# Patient Record
Sex: FEMALE | Race: BLACK OR AFRICAN AMERICAN | NOT HISPANIC OR LATINO | Employment: OTHER | URBAN - METROPOLITAN AREA
[De-identification: names, ages, dates, MRNs, and addresses within clinical notes are randomized per-mention and may not be internally consistent; named-entity substitution may affect disease eponyms.]

---

## 2017-09-05 DIAGNOSIS — R94.31 ABNORMAL EKG: Primary | ICD-10-CM

## 2019-12-06 PROBLEM — N93.9 ABNORMAL UTERINE BLEEDING (AUB): Status: ACTIVE | Noted: 2019-12-06

## 2021-04-16 ENCOUNTER — HOSPITAL ENCOUNTER (EMERGENCY)
Facility: HOSPITAL | Age: 46
Discharge: HOME | End: 2021-04-16
Attending: EMERGENCY MEDICINE
Payer: MEDICAID

## 2021-04-16 VITALS
HEIGHT: 68 IN | WEIGHT: 150 LBS | HEART RATE: 57 BPM | TEMPERATURE: 99 F | BODY MASS INDEX: 22.73 KG/M2 | SYSTOLIC BLOOD PRESSURE: 179 MMHG | OXYGEN SATURATION: 100 % | RESPIRATION RATE: 20 BRPM | DIASTOLIC BLOOD PRESSURE: 106 MMHG

## 2021-04-16 DIAGNOSIS — S16.1XXA STRAIN OF NECK MUSCLE, INITIAL ENCOUNTER: ICD-10-CM

## 2021-04-16 DIAGNOSIS — Z04.1 ENCOUNTER FOR EXAMINATION FOLLOWING MOTOR VEHICLE COLLISION (MVC): Primary | ICD-10-CM

## 2021-04-16 PROCEDURE — 99284 EMERGENCY DEPT VISIT MOD MDM: CPT | Mod: 25

## 2021-04-16 PROCEDURE — 25000003 PHARM REV CODE 250: Performed by: PHYSICIAN ASSISTANT

## 2021-04-16 RX ORDER — OXYCODONE AND ACETAMINOPHEN 5; 325 MG/1; MG/1
1 TABLET ORAL
Status: COMPLETED | OUTPATIENT
Start: 2021-04-16 | End: 2021-04-16

## 2021-04-16 RX ORDER — METHOCARBAMOL 500 MG/1
500 TABLET, FILM COATED ORAL 3 TIMES DAILY
Qty: 30 TABLET | Refills: 0 | Status: SHIPPED | OUTPATIENT
Start: 2021-04-16 | End: 2021-04-21

## 2021-04-16 RX ORDER — NAPROXEN 500 MG/1
500 TABLET ORAL 2 TIMES DAILY PRN
Qty: 30 TABLET | Refills: 0 | Status: SHIPPED | OUTPATIENT
Start: 2021-04-16 | End: 2021-04-21

## 2021-04-16 RX ADMIN — OXYCODONE HYDROCHLORIDE AND ACETAMINOPHEN 1 TABLET: 5; 325 TABLET ORAL at 08:04

## 2022-09-02 DIAGNOSIS — M54.31 BILATERAL SCIATICA: Primary | ICD-10-CM

## 2022-09-02 DIAGNOSIS — M54.32 BILATERAL SCIATICA: Primary | ICD-10-CM

## 2022-10-21 ENCOUNTER — HOSPITAL ENCOUNTER (EMERGENCY)
Facility: HOSPITAL | Age: 47
Discharge: HOME | End: 2022-10-21
Attending: EMERGENCY MEDICINE
Payer: COMMERCIAL

## 2022-10-21 VITALS
RESPIRATION RATE: 19 BRPM | OXYGEN SATURATION: 100 % | HEART RATE: 60 BPM | SYSTOLIC BLOOD PRESSURE: 142 MMHG | HEIGHT: 65 IN | TEMPERATURE: 98 F | DIASTOLIC BLOOD PRESSURE: 86 MMHG | BODY MASS INDEX: 24.99 KG/M2 | WEIGHT: 150 LBS

## 2022-10-21 DIAGNOSIS — K92.1 BLACK STOOLS: ICD-10-CM

## 2022-10-21 DIAGNOSIS — R10.13 ABDOMINAL PAIN, ACUTE, EPIGASTRIC: Primary | ICD-10-CM

## 2022-10-21 DIAGNOSIS — M54.6 ACUTE MIDLINE THORACIC BACK PAIN: ICD-10-CM

## 2022-10-21 LAB
ALBUMIN SERPL BCP-MCNC: 4.1 G/DL (ref 3.5–5.2)
ALP SERPL-CCNC: 146 U/L (ref 55–135)
ALT SERPL W/O P-5'-P-CCNC: 18 U/L (ref 10–44)
ANION GAP SERPL CALC-SCNC: 12 MMOL/L (ref 8–16)
AST SERPL-CCNC: 33 U/L (ref 10–40)
BASOPHILS # BLD AUTO: 0.02 K/UL (ref 0–0.2)
BASOPHILS NFR BLD: 0.4 % (ref 0–1.9)
BILIRUB SERPL-MCNC: 0.2 MG/DL (ref 0.1–1)
BUN SERPL-MCNC: 16 MG/DL (ref 6–20)
CALCIUM SERPL-MCNC: 9.3 MG/DL (ref 8.7–10.5)
CHLORIDE SERPL-SCNC: 109 MMOL/L (ref 95–110)
CO2 SERPL-SCNC: 24 MMOL/L (ref 23–29)
CREAT SERPL-MCNC: 0.9 MG/DL (ref 0.5–1.4)
DIFFERENTIAL METHOD: ABNORMAL
EOSINOPHIL # BLD AUTO: 0.2 K/UL (ref 0–0.5)
EOSINOPHIL NFR BLD: 2.6 % (ref 0–8)
ERYTHROCYTE [DISTWIDTH] IN BLOOD BY AUTOMATED COUNT: 12.7 % (ref 11.5–14.5)
EST. GFR  (NO RACE VARIABLE): >60 ML/MIN/1.73 M^2
GLUCOSE SERPL-MCNC: 125 MG/DL (ref 70–110)
HCT VFR BLD AUTO: 37.3 % (ref 37–48.5)
HGB BLD-MCNC: 11.9 G/DL (ref 12–16)
IMM GRANULOCYTES # BLD AUTO: 0.01 K/UL (ref 0–0.04)
IMM GRANULOCYTES NFR BLD AUTO: 0.2 % (ref 0–0.5)
LIPASE SERPL-CCNC: 41 U/L (ref 4–60)
LYMPHOCYTES # BLD AUTO: 2.3 K/UL (ref 1–4.8)
LYMPHOCYTES NFR BLD: 40.6 % (ref 18–48)
MCH RBC QN AUTO: 29 PG (ref 27–31)
MCHC RBC AUTO-ENTMCNC: 31.9 G/DL (ref 32–36)
MCV RBC AUTO: 91 FL (ref 82–98)
MONOCYTES # BLD AUTO: 0.5 K/UL (ref 0.3–1)
MONOCYTES NFR BLD: 8.1 % (ref 4–15)
NEUTROPHILS # BLD AUTO: 2.7 K/UL (ref 1.8–7.7)
NEUTROPHILS NFR BLD: 48.1 % (ref 38–73)
NRBC BLD-RTO: 0 /100 WBC
PLATELET # BLD AUTO: 203 K/UL (ref 150–450)
PMV BLD AUTO: 11.2 FL (ref 9.2–12.9)
POTASSIUM SERPL-SCNC: 4.1 MMOL/L (ref 3.5–5.1)
PROT SERPL-MCNC: 7.8 G/DL (ref 6–8.4)
RBC # BLD AUTO: 4.1 M/UL (ref 4–5.4)
SODIUM SERPL-SCNC: 145 MMOL/L (ref 136–145)
WBC # BLD AUTO: 5.69 K/UL (ref 3.9–12.7)

## 2022-10-21 PROCEDURE — 85025 COMPLETE CBC W/AUTO DIFF WBC: CPT | Performed by: EMERGENCY MEDICINE

## 2022-10-21 PROCEDURE — 80053 COMPREHEN METABOLIC PANEL: CPT | Performed by: EMERGENCY MEDICINE

## 2022-10-21 PROCEDURE — C9113 INJ PANTOPRAZOLE SODIUM, VIA: HCPCS | Performed by: EMERGENCY MEDICINE

## 2022-10-21 PROCEDURE — 63600175 PHARM REV CODE 636 W HCPCS: Performed by: EMERGENCY MEDICINE

## 2022-10-21 PROCEDURE — 96374 THER/PROPH/DIAG INJ IV PUSH: CPT

## 2022-10-21 PROCEDURE — 99284 EMERGENCY DEPT VISIT MOD MDM: CPT | Mod: 25

## 2022-10-21 PROCEDURE — 93010 EKG 12-LEAD: ICD-10-PCS | Mod: ,,, | Performed by: INTERNAL MEDICINE

## 2022-10-21 PROCEDURE — 83690 ASSAY OF LIPASE: CPT | Performed by: EMERGENCY MEDICINE

## 2022-10-21 PROCEDURE — 93005 ELECTROCARDIOGRAM TRACING: CPT

## 2022-10-21 PROCEDURE — 93010 ELECTROCARDIOGRAM REPORT: CPT | Mod: ,,, | Performed by: INTERNAL MEDICINE

## 2022-10-21 RX ORDER — PANTOPRAZOLE SODIUM 40 MG/1
TABLET, DELAYED RELEASE ORAL
Qty: 20 TABLET | Refills: 0 | Status: SHIPPED | OUTPATIENT
Start: 2022-10-21 | End: 2023-08-15

## 2022-10-21 RX ORDER — PANTOPRAZOLE SODIUM 40 MG/10ML
80 INJECTION, POWDER, LYOPHILIZED, FOR SOLUTION INTRAVENOUS
Status: COMPLETED | OUTPATIENT
Start: 2022-10-21 | End: 2022-10-21

## 2022-10-21 RX ORDER — TRAMADOL HYDROCHLORIDE 50 MG/1
50 TABLET ORAL EVERY 6 HOURS PRN
Qty: 8 TABLET | Refills: 0 | Status: SHIPPED | OUTPATIENT
Start: 2022-10-21 | End: 2022-10-26

## 2022-10-21 RX ADMIN — PANTOPRAZOLE SODIUM 80 MG: 40 INJECTION, POWDER, FOR SOLUTION INTRAVENOUS at 07:10

## 2022-10-21 NOTE — ED PROVIDER NOTES
Encounter Date: 10/21/2022    SCRIBE #1 NOTE: I, Jannie Bowman, am scribing for, and in the presence of,  Sal Beck MD. Other sections scribed: HPI, ROS.     History     Chief Complaint   Patient presents with    Abdominal Pain    Dark stools     Pt reports she has been on pain meds and abx since having a dental implant a week ago. Now she is having dark stools and abdominal pain.      CC: Dark stools    HPI: This is a 46 y.o. F who has HTN, and Mitral Valve Prolapse who presents to the ED for emergent evaluation of acute black stools that began 1 week ago. Pt report getting a dental implant last week, in which she was prescribed pain medication and antibiotic at that time. She believed that the black stools were due to the antibiotics prompting her to stopped taking the antibiotics. Although she stopped taking the antibiotics, the pt states that the black stools persist. Pt also complains of acute epigastric abdominal pain that began 5 days ago, and mid back pain that began this morning. The back pain is not worse with movement. She has been taking Peptobismol and TUMS for the abdominal pain. She has also been experiencing acid reflux. She also reports 1 episode of diarrhea last night. Pt denies a Hx of gastric ulcers, or GI bleeding. She cannot recall if she has ever had an EGD. Pt adds an episode of fatigue, and dizziness that occurred yesterday. She states that the fatigue and dizziness is not unusual for her due to intermittent episodes of fatigue and dizziness secondary to perimenopausal. She denies chances of being pregnant. Her last menstrual period was 6 months ago due to being perimenopausal. Pt states that she was recently started on an antidepressant for hot flashes. She is currently not taking iron supplements, or any other daily supplements. Pt adds that she is seen by a Cardiologist once a year for Mitral valve prolapse. Pt denies fever, chills, ear pain, sore throat, eye problem, cough, SOB,  CP, nausea, vomiting, dysuria, arm or leg problems, rash, or headaches.        The history is provided by the patient. No  was used.   Review of patient's allergies indicates:  No Known Allergies  Past Medical History:   Diagnosis Date    Herpes simplex without mention of complication     Hypertension     MVP (mitral valve prolapse)      Past Surgical History:   Procedure Laterality Date    DILATION AND CURETTAGE OF UTERUS      VAGINAL DELIVERY      x4 normal     Family History   Problem Relation Age of Onset    Diabetes Other     Hypertension Other     Cancer Neg Hx      Social History     Tobacco Use    Smoking status: Never    Smokeless tobacco: Never   Substance Use Topics    Alcohol use: Yes     Comment: Occassionally    Drug use: No     Review of Systems   Constitutional:  Positive for fatigue (resolved). Negative for chills and fever.   HENT:  Negative for congestion, ear pain, rhinorrhea and sore throat.    Eyes:  Negative for visual disturbance.   Respiratory:  Negative for cough and shortness of breath.    Cardiovascular:  Negative for chest pain.   Gastrointestinal:  Positive for abdominal pain and diarrhea (resolved). Negative for nausea and vomiting.        (+) Melena   Genitourinary:  Negative for dysuria.   Musculoskeletal:  Positive for back pain. Negative for myalgias.   Skin:  Negative for rash.   Neurological:  Positive for dizziness (resolved). Negative for weakness.   Hematological:  Does not bruise/bleed easily.     Physical Exam     Initial Vitals [10/21/22 0642]   BP Pulse Resp Temp SpO2   132/80 77 18 97.9 °F (36.6 °C) 100 %      MAP       --         Physical Exam  The patient was examined specifically for the following:   General:No significant distress, Good color, Warm and dry. Head and neck:Scalp atraumatic, Neck supple. Neurological:Appropriate conversation, Gross motor deficits. Eyes:Conjugate gaze, Clear corneas. ENT: No epistaxis. Cardiac: Regular rate and rhythm,  Grossly normal heart tones. Pulmonary: Wheezing, Rales. Gastrointestinal: Abdominal tenderness, Abdominal distention. Musculoskeletal: Extremity deformity, Apparent pain with range of motion of the joints. Skin: Rash.   The findings on examination were normal except for the following:  The patient has almost no stool in the rectal vault.  The glove stains guaiac-negative.  There is no significant abdominal or back tenderness.  There is no guarding rebound mass or distention.  ED Course   Procedures  Labs Reviewed   COMPREHENSIVE METABOLIC PANEL - Abnormal; Notable for the following components:       Result Value    Glucose 125 (*)     Alkaline Phosphatase 146 (*)     All other components within normal limits   CBC W/ AUTO DIFFERENTIAL - Abnormal; Notable for the following components:    Hemoglobin 11.9 (*)     MCHC 31.9 (*)     All other components within normal limits   LIPASE          Imaging Results    None       Medical decision making:  Given the above, this patient presents emergency with epigastric abdominal pain and thoracic back pain.  She reports dark stools.  She had been using Pepto-Bismol.  She is on antibiotics for a dental implant infection.  She denies fever chills sweats facial pain swelling.  She is concerned about blood in her stool.  Stool is guaiac-negative.  There is no significant abdominal tenderness.  I doubt pancreatitis peritonitis GI bleeding.  I will treat with pantoprazole for epigastric discomfort and tramadol for thoracolumbar back pain.  I doubt perforated ulcer.        Medications   pantoprazole injection 80 mg (has no administration in time range)              Scribe Attestation:   Scribe #1: I performed the above scribed service and the documentation accurately describes the services I performed. I attest to the accuracy of the note.                 I personally performed the services described in this documentation.  All medical record  entries made by the scribe are at my  direction and in my presence.  Signed, Dr. Beck    Clinical Impression:   Final diagnoses:  [R10.13] Abdominal pain, acute, epigastric (Primary)  [M54.6] Acute midline thoracic back pain  [K92.1] Black stools      ED Disposition Condition    Discharge Stable          ED Prescriptions       Medication Sig Dispense Start Date End Date Auth. Provider    traMADoL (ULTRAM) 50 mg tablet Take 1 tablet (50 mg total) by mouth every 6 (six) hours as needed for Pain. 8 tablet 10/21/2022 10/26/2022 Sal Beck MD    pantoprazole (PROTONIX) 40 MG tablet Please take 1 tablet by mouth every 12 hours when you have epigastric discomfort, then 1 tablet every day. 20 tablet 10/21/2022 -- Sal Beck MD          Follow-up Information       Follow up With Specialties Details Why Contact Info    Yony Watt MD Family Medicine In 3 days  [address] [facility]  [place] [address]  [phone]               Sal Beck MD  10/21/22 0809

## 2022-10-21 NOTE — DISCHARGE INSTRUCTIONS
Please avoid caffeine carbonation alcohol cigarette citrus tomato Naprosyn aspirin ibuprofen.  Please stop your meloxicam.  Please begin pantoprazole.  Tylenol or Tramadol for back pain.  Please return immediately if you get worse or if new problems develop.  Please follow-up with the primary care doctor next week.

## 2022-10-21 NOTE — ED NOTES
Adult Physical Assessment  LOC: Racquel Orozco, 46 y.o. female verified via two identifiers.  The patient is awake, alert, oriented and speaking appropriately at this time.  APPEARANCE: Patient resting comfortably and appears to be in no acute distress at this time. Patient is clean and well groomed, patient's clothing is properly fastened.  SKIN:The skin is warm and dry, color consistent with ethnicity, patient has normal skin turgor and moist mucus membranes, skin intact, no breakdown or brusing noted.  MUSCULOSKELETAL: Patient moving all extremities well, no obvious swelling or deformities noted.  RESPIRATORY: Airway is open and patent, respirations are spontaneous, patient has a normal effort and rate, no accessory muscle use noted.  CARDIAC: Patient has a normal rate and rhythm, no periphreal edema noted in any extremity, capillary refill < 3 seconds in all extremities  ABDOMEN: Soft and non tender to palpation, no abdominal distention noted. Bowel sounds present in all four quadrants.  NEUROLOGIC: Eyes open spontaneously, behavior appropriate to situation, follows commands, facial expression symmetrical, bilateral hand grasp equal and even, purposeful motor response noted, normal sensation in all extremities when touched with a finger.

## 2023-02-20 ENCOUNTER — HOSPITAL ENCOUNTER (EMERGENCY)
Facility: HOSPITAL | Age: 48
Discharge: HOME | End: 2023-02-20
Attending: EMERGENCY MEDICINE
Payer: COMMERCIAL

## 2023-02-20 VITALS
HEART RATE: 52 BPM | SYSTOLIC BLOOD PRESSURE: 139 MMHG | HEIGHT: 65 IN | TEMPERATURE: 98 F | DIASTOLIC BLOOD PRESSURE: 70 MMHG | WEIGHT: 160 LBS | RESPIRATION RATE: 20 BRPM | BODY MASS INDEX: 26.66 KG/M2 | OXYGEN SATURATION: 100 %

## 2023-02-20 DIAGNOSIS — G43.809 OTHER MIGRAINE WITHOUT STATUS MIGRAINOSUS, NOT INTRACTABLE: Primary | ICD-10-CM

## 2023-02-20 LAB
ALBUMIN SERPL BCP-MCNC: 4.1 G/DL (ref 3.5–5.2)
ALP SERPL-CCNC: 137 U/L (ref 55–135)
ALT SERPL W/O P-5'-P-CCNC: 13 U/L (ref 10–44)
ANION GAP SERPL CALC-SCNC: 9 MMOL/L (ref 8–16)
AST SERPL-CCNC: 25 U/L (ref 10–40)
B-HCG UR QL: NEGATIVE
BASOPHILS # BLD AUTO: 0.03 K/UL (ref 0–0.2)
BASOPHILS NFR BLD: 0.5 % (ref 0–1.9)
BILIRUB SERPL-MCNC: 0.4 MG/DL (ref 0.1–1)
BUN SERPL-MCNC: 11 MG/DL (ref 6–20)
CALCIUM SERPL-MCNC: 9.3 MG/DL (ref 8.7–10.5)
CHLORIDE SERPL-SCNC: 106 MMOL/L (ref 95–110)
CO2 SERPL-SCNC: 25 MMOL/L (ref 23–29)
CREAT SERPL-MCNC: 0.9 MG/DL (ref 0.5–1.4)
CTP QC/QA: YES
DIFFERENTIAL METHOD: ABNORMAL
EOSINOPHIL # BLD AUTO: 0.2 K/UL (ref 0–0.5)
EOSINOPHIL NFR BLD: 2.5 % (ref 0–8)
ERYTHROCYTE [DISTWIDTH] IN BLOOD BY AUTOMATED COUNT: 13.2 % (ref 11.5–14.5)
EST. GFR  (NO RACE VARIABLE): >60 ML/MIN/1.73 M^2
GLUCOSE SERPL-MCNC: 103 MG/DL (ref 70–110)
HCT VFR BLD AUTO: 39.8 % (ref 37–48.5)
HGB BLD-MCNC: 12.6 G/DL (ref 12–16)
IMM GRANULOCYTES # BLD AUTO: 0.02 K/UL (ref 0–0.04)
IMM GRANULOCYTES NFR BLD AUTO: 0.3 % (ref 0–0.5)
LYMPHOCYTES # BLD AUTO: 2.8 K/UL (ref 1–4.8)
LYMPHOCYTES NFR BLD: 43.9 % (ref 18–48)
MCH RBC QN AUTO: 27.8 PG (ref 27–31)
MCHC RBC AUTO-ENTMCNC: 31.7 G/DL (ref 32–36)
MCV RBC AUTO: 88 FL (ref 82–98)
MONOCYTES # BLD AUTO: 0.5 K/UL (ref 0.3–1)
MONOCYTES NFR BLD: 8.2 % (ref 4–15)
NEUTROPHILS # BLD AUTO: 2.8 K/UL (ref 1.8–7.7)
NEUTROPHILS NFR BLD: 44.6 % (ref 38–73)
NRBC BLD-RTO: 0 /100 WBC
PLATELET # BLD AUTO: 238 K/UL (ref 150–450)
PMV BLD AUTO: 11.2 FL (ref 9.2–12.9)
POTASSIUM SERPL-SCNC: 3.6 MMOL/L (ref 3.5–5.1)
PROT SERPL-MCNC: 8 G/DL (ref 6–8.4)
RBC # BLD AUTO: 4.53 M/UL (ref 4–5.4)
SODIUM SERPL-SCNC: 140 MMOL/L (ref 136–145)
WBC # BLD AUTO: 6.36 K/UL (ref 3.9–12.7)

## 2023-02-20 PROCEDURE — 25000003 PHARM REV CODE 250: Performed by: EMERGENCY MEDICINE

## 2023-02-20 PROCEDURE — 96375 TX/PRO/DX INJ NEW DRUG ADDON: CPT

## 2023-02-20 PROCEDURE — 99285 EMERGENCY DEPT VISIT HI MDM: CPT | Mod: 25

## 2023-02-20 PROCEDURE — 96374 THER/PROPH/DIAG INJ IV PUSH: CPT

## 2023-02-20 PROCEDURE — 85025 COMPLETE CBC W/AUTO DIFF WBC: CPT | Performed by: EMERGENCY MEDICINE

## 2023-02-20 PROCEDURE — 96361 HYDRATE IV INFUSION ADD-ON: CPT

## 2023-02-20 PROCEDURE — 63600175 PHARM REV CODE 636 W HCPCS: Performed by: EMERGENCY MEDICINE

## 2023-02-20 PROCEDURE — 81025 URINE PREGNANCY TEST: CPT | Performed by: EMERGENCY MEDICINE

## 2023-02-20 PROCEDURE — 80053 COMPREHEN METABOLIC PANEL: CPT | Performed by: EMERGENCY MEDICINE

## 2023-02-20 RX ORDER — BISOPROLOL FUMARATE 5 MG/1
5 TABLET, FILM COATED ORAL DAILY
COMMUNITY

## 2023-02-20 RX ORDER — DIPHENHYDRAMINE HYDROCHLORIDE 50 MG/ML
25 INJECTION INTRAMUSCULAR; INTRAVENOUS
Status: COMPLETED | OUTPATIENT
Start: 2023-02-20 | End: 2023-02-20

## 2023-02-20 RX ORDER — PROCHLORPERAZINE EDISYLATE 5 MG/ML
10 INJECTION INTRAMUSCULAR; INTRAVENOUS ONCE
Status: COMPLETED | OUTPATIENT
Start: 2023-02-20 | End: 2023-02-20

## 2023-02-20 RX ORDER — BUTALBITAL, ACETAMINOPHEN AND CAFFEINE 50; 325; 40 MG/1; MG/1; MG/1
1 TABLET ORAL EVERY 6 HOURS PRN
Qty: 10 TABLET | Refills: 0 | Status: SHIPPED | OUTPATIENT
Start: 2023-02-20 | End: 2023-03-22

## 2023-02-20 RX ORDER — CETIRIZINE HYDROCHLORIDE 10 MG/1
10 TABLET ORAL DAILY
COMMUNITY
End: 2023-08-15

## 2023-02-20 RX ORDER — AMLODIPINE BESYLATE 10 MG/1
10 TABLET ORAL
COMMUNITY
Start: 2022-09-26 | End: 2023-08-14 | Stop reason: CLARIF

## 2023-02-20 RX ORDER — MULTIVITAMIN
1 TABLET ORAL DAILY
COMMUNITY

## 2023-02-20 RX ORDER — PAROXETINE 10 MG/1
10 TABLET, FILM COATED ORAL EVERY MORNING
COMMUNITY

## 2023-02-20 RX ADMIN — PROCHLORPERAZINE EDISYLATE 10 MG: 5 INJECTION INTRAMUSCULAR; INTRAVENOUS at 06:02

## 2023-02-20 RX ADMIN — DIPHENHYDRAMINE HYDROCHLORIDE 25 MG: 50 INJECTION, SOLUTION INTRAMUSCULAR; INTRAVENOUS at 06:02

## 2023-02-20 RX ADMIN — SODIUM CHLORIDE 1000 ML: 9 INJECTION, SOLUTION INTRAVENOUS at 06:02

## 2023-02-20 NOTE — ED PROVIDER NOTES
"Encounter Date: 2/20/2023    SCRIBE #1 NOTE: I, Francesca Loco, am scribing for, and in the presence of,  Oneil Julien MD. I have scribed the following portions of the note - Other sections scribed: HPI, ROS.     History     Chief Complaint   Patient presents with    Headache     Arrives to ER reports having headache for about a day and last night became worse states "It woke me up." Reports headache started at the top and feels like it radiates to occipital area. Denies n/v/d dizziness, blurred vision. Hx of HTN. + ringing in her ears also reports hasn't taken amlodipine in a few weeks.      47 y.o. female with PMHx of HTN who presents to the ED for chief complaint of headache. Patient reports having a dull headache to the top of her head prior to going to bed. Around 2 AM she was woken up by a severe headache radiating to her entire scalp. She describes her headache as a "cringing" pain. No attempted treatment. She denies ever having a headache to this severity, but notes more frequent headaches since being perimenopausal. Patient says that she had no symptoms yesterday. Patient additionally endorses photophobia, slight neck pain, and a resolved episode of tingling to her left foot. She does note that she was laying in a manner that may have made her foot tingle. Denies any blurry vision, numbness, nausea, vomiting, fever, or chills. Patient takes Losartan, Amlodipine, Paxil, and Bisoprolol. Patient endorses her BP has been running "good" lately, but she was unable to check it during this episode.     The history is provided by the patient. No  was used.   Review of patient's allergies indicates:  No Known Allergies  Past Medical History:   Diagnosis Date    Herpes simplex without mention of complication     Hypertension     MVP (mitral valve prolapse)      Past Surgical History:   Procedure Laterality Date    DILATION AND CURETTAGE OF UTERUS      VAGINAL DELIVERY      x4 normal     Family " History   Problem Relation Age of Onset    Diabetes Other     Hypertension Other     Cancer Neg Hx      Social History     Tobacco Use    Smoking status: Never    Smokeless tobacco: Never   Substance Use Topics    Alcohol use: Yes     Comment: Occassionally    Drug use: No     Review of Systems   Constitutional: Negative.  Negative for chills and fever.   HENT: Negative.     Eyes:  Positive for photophobia. Negative for visual disturbance.   Respiratory: Negative.     Cardiovascular: Negative.    Gastrointestinal: Negative.  Negative for nausea and vomiting.   Genitourinary: Negative.    Musculoskeletal:  Positive for neck pain.   Skin: Negative.    Neurological:  Positive for headaches. Negative for numbness.        (+) Tingling.     Physical Exam     Initial Vitals [02/20/23 0548]   BP Pulse Resp Temp SpO2   (!) 182/80 61 20 98.1 °F (36.7 °C) 99 %      MAP       --         Physical Exam    Nursing note and vitals reviewed.  Constitutional: She appears well-developed and well-nourished. She is not diaphoretic. No distress.   HENT:   Head: Normocephalic and atraumatic.   Nose: Nose normal.   Eyes: EOM are normal. Pupils are equal, round, and reactive to light.   Neck: Neck supple. No JVD present.   Normal range of motion.  Cardiovascular:  Normal rate, regular rhythm, normal heart sounds and intact distal pulses.           Pulmonary/Chest: Breath sounds normal. No stridor. No respiratory distress. She has no wheezes. She has no rales.   Abdominal: Abdomen is soft. Bowel sounds are normal. She exhibits no distension. There is no abdominal tenderness.   Musculoskeletal:         General: No tenderness or edema. Normal range of motion.      Cervical back: Normal range of motion and neck supple.     Neurological: She is alert and oriented to person, place, and time. She has normal strength. No cranial nerve deficit.   Skin: Skin is warm and dry. Capillary refill takes less than 2 seconds. No rash noted. No erythema.        ED Course   Procedures  Labs Reviewed   CBC W/ AUTO DIFFERENTIAL - Abnormal; Notable for the following components:       Result Value    MCHC 31.7 (*)     All other components within normal limits   COMPREHENSIVE METABOLIC PANEL - Abnormal; Notable for the following components:    Alkaline Phosphatase 137 (*)     All other components within normal limits   POCT URINE PREGNANCY          Imaging Results              CT Head Without Contrast (Final result)  Result time 02/20/23 07:55:36      Final result by Andrwe Frazier MD (02/20/23 07:55:36)                   Impression:      No CT evidence of acute intracranial abnormality.      Electronically signed by: Andrew Frazier MD  Date:    02/20/2023  Time:    07:55               Narrative:    EXAMINATION:  CT HEAD WITHOUT CONTRAST    CLINICAL HISTORY:  Headache, new or worsening, neuro deficit (Age 19-49y);    TECHNIQUE:  Low dose axial images were obtained through the head.  Coronal and sagittal reformations were also performed. Contrast was not administered.    COMPARISON:  04/16/2021.    FINDINGS:  No evidence of acute territorial infarct, hemorrhage, mass effect, or midline shift.  Stable prominent intracranial calcifications.    Ventricles are normal in size and configuration.    No displaced calvarial fracture.    Visualized paranasal sinuses and mastoid air cells are essentially clear.                                       Medications   sodium chloride 0.9% bolus 1,000 mL 1,000 mL (1,000 mLs Intravenous New Bag 2/20/23 0637)   prochlorperazine injection Soln 10 mg (10 mg Intravenous Given 2/20/23 0639)   diphenhydrAMINE injection 25 mg (25 mg Intravenous Given 2/20/23 0639)     Medical Decision Making:   History:   Old Medical Records: I decided to obtain old medical records.  Clinical Tests:   Lab Tests: Reviewed and Ordered  Radiological Study: Ordered and Reviewed       MDM:    47 y.o.female with PMHx as noted abvoe presents with headache. Physical  exam as noted above.  ED workup remarkable for neg preg, CBC/CMP wnl, CT head unremarkable.  Pt presentation consistent with Migraine, no physical exam abnormalities, or focal neuro deficits noted, or further red flags to suggest alternate etiology.  Negative workup.  At this time given patient's history, physical exam, and ED workup do not suspect ICH, symptomatic aneurysm, temporal arteritis, meningitis/encephalitis, electrolyte abnormality, acute blood loss anemia, AACG, fracture/trauma, or any further malignant cause.  Pt treated with compazine/benadryl and IVF bolus with complete sxm relief upon reassessment.  Pt advised on further conservative treatment options at home, as well as f/u.  Return precautions given and all questions answered.  Patient in understanding of plan.  Pt discharged to home improved and stable.         Scribe Attestation:   Scribe #1: I performed the above scribed service and the documentation accurately describes the services I performed. I attest to the accuracy of the note.                 I, Oneil Julien M.D, personally performed the services described in this documentation.  All medical record entries made by the scribe were at my direction and in my presence.  I have reviewed the chart and agree that the record reflects my personal performance and is accurate and complete.  Clinical Impression:   Final diagnoses:  [G43.809] Other migraine without status migrainosus, not intractable (Primary)        ED Disposition Condition    Discharge Stable          ED Prescriptions       Medication Sig Dispense Start Date End Date Auth. Provider    butalbital-acetaminophen-caffeine -40 mg (FIORICET, ESGIC) -40 mg per tablet Take 1 tablet by mouth every 6 (six) hours as needed for Pain or Headaches. 10 tablet 2/20/2023 3/22/2023 Oneil Julien MD          Follow-up Information       Follow up With Specialties Details Why Contact Info    [facility] Dept Emergency  Medicine Go to  If symptoms worsen [address] [place] [id number] [phone]    Yony Watt MD Family Medicine Go in 1 week As needed [address] [place] [address]  [phone]               Oneil Julien MD  02/20/23 0893

## 2023-02-20 NOTE — ED NOTES
Pt to ed c/o headache that woke her up from her sleep. States headache began at the top of her head before she went to bed but now the pain is all over. Also c/o ringing in her ears. Denies blurry vision, n/v. States she has not been taking her amlodipine for at least 2 weeks but took it yesterday around noon.

## 2023-08-14 ENCOUNTER — HOSPITAL ENCOUNTER (OUTPATIENT)
Facility: HOSPITAL | Age: 48
Discharge: HOME | End: 2023-08-15
Attending: EMERGENCY MEDICINE | Admitting: EMERGENCY MEDICINE
Payer: COMMERCIAL

## 2023-08-14 DIAGNOSIS — I10 HYPERTENSION: ICD-10-CM

## 2023-08-14 DIAGNOSIS — I16.1 HYPERTENSIVE EMERGENCY: Primary | ICD-10-CM

## 2023-08-14 DIAGNOSIS — R79.89 ELEVATED TROPONIN: ICD-10-CM

## 2023-08-14 DIAGNOSIS — R07.9 CHEST PAIN: ICD-10-CM

## 2023-08-14 DIAGNOSIS — R79.89 TROPONIN LEVEL ELEVATED: ICD-10-CM

## 2023-08-14 DIAGNOSIS — R51.9 NONINTRACTABLE HEADACHE, UNSPECIFIED CHRONICITY PATTERN, UNSPECIFIED HEADACHE TYPE: ICD-10-CM

## 2023-08-14 LAB
ALBUMIN SERPL BCP-MCNC: 4.3 G/DL (ref 3.5–5.2)
ALP SERPL-CCNC: 173 U/L (ref 55–135)
ALT SERPL W/O P-5'-P-CCNC: 21 U/L (ref 10–44)
AMPHET+METHAMPHET UR QL: NEGATIVE
ANION GAP SERPL CALC-SCNC: 11 MMOL/L (ref 8–16)
AST SERPL-CCNC: 37 U/L (ref 10–40)
B-HCG UR QL: NEGATIVE
BACTERIA #/AREA URNS HPF: NORMAL /HPF
BARBITURATES UR QL SCN>200 NG/ML: NEGATIVE
BASOPHILS # BLD AUTO: 0.02 K/UL (ref 0–0.2)
BASOPHILS NFR BLD: 0.3 % (ref 0–1.9)
BENZODIAZ UR QL SCN>200 NG/ML: NEGATIVE
BILIRUB SERPL-MCNC: 0.3 MG/DL (ref 0.1–1)
BILIRUB UR QL STRIP: NEGATIVE
BNP SERPL-MCNC: 48 PG/ML (ref 0–99)
BUN SERPL-MCNC: 15 MG/DL (ref 6–20)
BZE UR QL SCN: NEGATIVE
CALCIUM SERPL-MCNC: 9.3 MG/DL (ref 8.7–10.5)
CANNABINOIDS UR QL SCN: NEGATIVE
CHLORIDE SERPL-SCNC: 106 MMOL/L (ref 95–110)
CLARITY UR: CLEAR
CO2 SERPL-SCNC: 24 MMOL/L (ref 23–29)
COLOR UR: COLORLESS
CREAT SERPL-MCNC: 1.1 MG/DL (ref 0.5–1.4)
CREAT UR-MCNC: 13.5 MG/DL (ref 15–325)
CTP QC/QA: YES
DIFFERENTIAL METHOD: NORMAL
EOSINOPHIL # BLD AUTO: 0.1 K/UL (ref 0–0.5)
EOSINOPHIL NFR BLD: 1.8 % (ref 0–8)
ERYTHROCYTE [DISTWIDTH] IN BLOOD BY AUTOMATED COUNT: 12.9 % (ref 11.5–14.5)
EST. GFR  (NO RACE VARIABLE): >60 ML/MIN/1.73 M^2
GLUCOSE SERPL-MCNC: 116 MG/DL (ref 70–110)
GLUCOSE UR QL STRIP: NEGATIVE
HCT VFR BLD AUTO: 41.2 % (ref 37–48.5)
HGB BLD-MCNC: 13.2 G/DL (ref 12–16)
HGB UR QL STRIP: NEGATIVE
HYALINE CASTS #/AREA URNS LPF: 0 /LPF
IMM GRANULOCYTES # BLD AUTO: 0.01 K/UL (ref 0–0.04)
IMM GRANULOCYTES NFR BLD AUTO: 0.1 % (ref 0–0.5)
KETONES UR QL STRIP: NEGATIVE
LEUKOCYTE ESTERASE UR QL STRIP: NEGATIVE
LYMPHOCYTES # BLD AUTO: 3.4 K/UL (ref 1–4.8)
LYMPHOCYTES NFR BLD: 46.1 % (ref 18–48)
MCH RBC QN AUTO: 27.9 PG (ref 27–31)
MCHC RBC AUTO-ENTMCNC: 32 G/DL (ref 32–36)
MCV RBC AUTO: 87 FL (ref 82–98)
METHADONE UR QL SCN>300 NG/ML: NEGATIVE
MICROSCOPIC COMMENT: NORMAL
MONOCYTES # BLD AUTO: 0.7 K/UL (ref 0.3–1)
MONOCYTES NFR BLD: 9.3 % (ref 4–15)
NEUTROPHILS # BLD AUTO: 3.1 K/UL (ref 1.8–7.7)
NEUTROPHILS NFR BLD: 42.4 % (ref 38–73)
NITRITE UR QL STRIP: NEGATIVE
NRBC BLD-RTO: 0 /100 WBC
OPIATES UR QL SCN: NEGATIVE
PCP UR QL SCN>25 NG/ML: NEGATIVE
PH UR STRIP: 7 [PH] (ref 5–8)
PLATELET # BLD AUTO: 199 K/UL (ref 150–450)
PMV BLD AUTO: 11.7 FL (ref 9.2–12.9)
POTASSIUM SERPL-SCNC: 4.1 MMOL/L (ref 3.5–5.1)
PROT SERPL-MCNC: 8.3 G/DL (ref 6–8.4)
PROT UR QL STRIP: ABNORMAL
RBC # BLD AUTO: 4.73 M/UL (ref 4–5.4)
RBC #/AREA URNS HPF: 0 /HPF (ref 0–4)
SODIUM SERPL-SCNC: 141 MMOL/L (ref 136–145)
SP GR UR STRIP: 1.01 (ref 1–1.03)
TOXICOLOGY INFORMATION: ABNORMAL
TROPONIN I SERPL DL<=0.01 NG/ML-MCNC: <0.006 NG/ML (ref 0–0.03)
URN SPEC COLLECT METH UR: ABNORMAL
UROBILINOGEN UR STRIP-ACNC: NEGATIVE EU/DL
WBC # BLD AUTO: 7.31 K/UL (ref 3.9–12.7)
WBC #/AREA URNS HPF: 0 /HPF (ref 0–5)

## 2023-08-14 PROCEDURE — 84484 ASSAY OF TROPONIN QUANT: CPT | Performed by: EMERGENCY MEDICINE

## 2023-08-14 PROCEDURE — 83880 ASSAY OF NATRIURETIC PEPTIDE: CPT | Performed by: EMERGENCY MEDICINE

## 2023-08-14 PROCEDURE — 85025 COMPLETE CBC W/AUTO DIFF WBC: CPT | Performed by: EMERGENCY MEDICINE

## 2023-08-14 PROCEDURE — 93010 EKG 12-LEAD: ICD-10-PCS | Mod: ,,, | Performed by: INTERNAL MEDICINE

## 2023-08-14 PROCEDURE — 93005 ELECTROCARDIOGRAM TRACING: CPT

## 2023-08-14 PROCEDURE — 81000 URINALYSIS NONAUTO W/SCOPE: CPT | Mod: 59 | Performed by: EMERGENCY MEDICINE

## 2023-08-14 PROCEDURE — 93010 ELECTROCARDIOGRAM REPORT: CPT | Mod: ,,, | Performed by: INTERNAL MEDICINE

## 2023-08-14 PROCEDURE — 81003 URINALYSIS AUTO W/O SCOPE: CPT | Mod: 59 | Performed by: EMERGENCY MEDICINE

## 2023-08-14 PROCEDURE — 80307 DRUG TEST PRSMV CHEM ANLYZR: CPT | Performed by: EMERGENCY MEDICINE

## 2023-08-14 PROCEDURE — 81025 URINE PREGNANCY TEST: CPT | Performed by: EMERGENCY MEDICINE

## 2023-08-14 PROCEDURE — 25000003 PHARM REV CODE 250: Performed by: EMERGENCY MEDICINE

## 2023-08-14 PROCEDURE — 80053 COMPREHEN METABOLIC PANEL: CPT | Performed by: EMERGENCY MEDICINE

## 2023-08-14 PROCEDURE — 99285 EMERGENCY DEPT VISIT HI MDM: CPT | Mod: 25

## 2023-08-14 RX ORDER — ACETAMINOPHEN 500 MG
1000 TABLET ORAL
Status: COMPLETED | OUTPATIENT
Start: 2023-08-14 | End: 2023-08-14

## 2023-08-14 RX ORDER — CLONIDINE HYDROCHLORIDE 0.1 MG/1
0.2 TABLET ORAL
Status: COMPLETED | OUTPATIENT
Start: 2023-08-14 | End: 2023-08-14

## 2023-08-14 RX ADMIN — ACETAMINOPHEN 1000 MG: 500 TABLET ORAL at 11:08

## 2023-08-14 RX ADMIN — CLONIDINE HYDROCHLORIDE 0.2 MG: 0.1 TABLET ORAL at 10:08

## 2023-08-14 NOTE — Clinical Note
An angiography was performed of the left renal artery via power injection. Injection was performed with 10 mL contrast at 4 mL/s. The power injection PSI was 400.   Catheter removed.

## 2023-08-14 NOTE — Clinical Note
The catheter was repositioned into the ostium   left main. An angiography was performed of the left coronary arteries. Multiple views were taken.  Catheter removed.

## 2023-08-14 NOTE — Clinical Note
An angiography was performed of the right renal artery via power injection. Injection was performed with 10 mL contrast at 4 mL/s. The power injection PSI was 400.

## 2023-08-15 VITALS
WEIGHT: 165 LBS | TEMPERATURE: 98 F | DIASTOLIC BLOOD PRESSURE: 80 MMHG | BODY MASS INDEX: 27.49 KG/M2 | HEART RATE: 54 BPM | HEIGHT: 65 IN | RESPIRATION RATE: 18 BRPM | OXYGEN SATURATION: 100 % | SYSTOLIC BLOOD PRESSURE: 165 MMHG

## 2023-08-15 PROBLEM — I16.1 HYPERTENSIVE EMERGENCY: Status: ACTIVE | Noted: 2023-08-15

## 2023-08-15 PROBLEM — I10 ESSENTIAL HYPERTENSION: Status: ACTIVE | Noted: 2023-08-15

## 2023-08-15 PROBLEM — I49.1 APC (ATRIAL PREMATURE CONTRACTIONS): Status: ACTIVE | Noted: 2023-07-25

## 2023-08-15 PROBLEM — F41.1 GENERALIZED ANXIETY DISORDER: Status: ACTIVE | Noted: 2022-06-24

## 2023-08-15 PROBLEM — R79.89 ELEVATED TROPONIN: Status: ACTIVE | Noted: 2023-08-15

## 2023-08-15 LAB
ALBUMIN SERPL BCP-MCNC: 3.8 G/DL (ref 3.5–5.2)
ALP SERPL-CCNC: 158 U/L (ref 55–135)
ALT SERPL W/O P-5'-P-CCNC: 16 U/L (ref 10–44)
ANION GAP SERPL CALC-SCNC: 11 MMOL/L (ref 8–16)
ASCENDING AORTA: 3.07 CM
AST SERPL-CCNC: 26 U/L (ref 10–40)
AV INDEX (PROSTH): 0.88
AV MEAN GRADIENT: 4 MMHG
AV PEAK GRADIENT: 7 MMHG
AV VALVE AREA BY VELOCITY RATIO: 2.69 CM²
AV VALVE AREA: 2.35 CM²
AV VELOCITY RATIO: 1
BILIRUB SERPL-MCNC: 0.3 MG/DL (ref 0.1–1)
BSA FOR ECHO PROCEDURE: 1.85 M2
BUN SERPL-MCNC: 14 MG/DL (ref 6–20)
CALCIUM SERPL-MCNC: 9.4 MG/DL (ref 8.7–10.5)
CHLORIDE SERPL-SCNC: 105 MMOL/L (ref 95–110)
CHOLEST SERPL-MCNC: 188 MG/DL (ref 120–199)
CHOLEST/HDLC SERPL: 3.3 {RATIO} (ref 2–5)
CO2 SERPL-SCNC: 25 MMOL/L (ref 23–29)
CREAT SERPL-MCNC: 1 MG/DL (ref 0.5–1.4)
CV ECHO LV RWT: 0.52 CM
DOP CALC AO PEAK VEL: 1.33 M/S
DOP CALC AO VTI: 32.3 CM
DOP CALC LVOT AREA: 2.7 CM2
DOP CALC LVOT DIAMETER: 1.85 CM
DOP CALC LVOT PEAK VEL: 1.33 M/S
DOP CALC LVOT STROKE VOLUME: 76.03 CM3
DOP CALCLVOT PEAK VEL VTI: 28.3 CM
E WAVE DECELERATION TIME: 249.1 MSEC
E/A RATIO: 0.99
E/E' RATIO: 8.8 M/S
ECHO LV POSTERIOR WALL: 1.08 CM (ref 0.6–1.1)
EST. GFR  (NO RACE VARIABLE): >60 ML/MIN/1.73 M^2
FRACTIONAL SHORTENING: 30 % (ref 28–44)
GLUCOSE SERPL-MCNC: 122 MG/DL (ref 70–110)
HDLC SERPL-MCNC: 57 MG/DL (ref 40–75)
HDLC SERPL: 30.3 % (ref 20–50)
INTERVENTRICULAR SEPTUM: 1.06 CM (ref 0.6–1.1)
IVC DIAMETER: 1.86 CM
IVRT: 119.89 MSEC
LA MAJOR: 5 CM
LA MINOR: 3.25 CM
LA WIDTH: 3.3 CM
LDLC SERPL CALC-MCNC: 121.8 MG/DL (ref 63–159)
LEFT ATRIUM SIZE: 2.84 CM
LEFT ATRIUM VOLUME INDEX: 17.2 ML/M2
LEFT ATRIUM VOLUME: 31.38 CM3
LEFT INTERNAL DIMENSION IN SYSTOLE: 2.91 CM (ref 2.1–4)
LEFT VENTRICLE DIASTOLIC VOLUME INDEX: 41.68 ML/M2
LEFT VENTRICLE DIASTOLIC VOLUME: 75.85 ML
LEFT VENTRICLE MASS INDEX: 81 G/M2
LEFT VENTRICLE SYSTOLIC VOLUME INDEX: 17.9 ML/M2
LEFT VENTRICLE SYSTOLIC VOLUME: 32.59 ML
LEFT VENTRICULAR INTERNAL DIMENSION IN DIASTOLE: 4.14 CM (ref 3.5–6)
LEFT VENTRICULAR MASS: 147.63 G
LV LATERAL E/E' RATIO: 8.25 M/S
LV SEPTAL E/E' RATIO: 9.43 M/S
LVOT MG: 3.31 MMHG
LVOT MV: 0.83 CM/S
MV PEAK A VEL: 0.67 M/S
MV PEAK E VEL: 0.66 M/S
MV STENOSIS PRESSURE HALF TIME: 72.24 MS
MV VALVE AREA P 1/2 METHOD: 3.05 CM2
NONHDLC SERPL-MCNC: 131 MG/DL
PISA TR MAX VEL: 2.53 M/S
POTASSIUM SERPL-SCNC: 4 MMOL/L (ref 3.5–5.1)
PROT SERPL-MCNC: 7.3 G/DL (ref 6–8.4)
PULM VEIN S/D RATIO: 1.87
PV PEAK D VEL: 0.38 M/S
PV PEAK GRADIENT: 2 MMHG
PV PEAK S VEL: 0.71 M/S
PV PEAK VELOCITY: 0.75 M/S
RA MAJOR: 4.9 CM
RA PRESSURE ESTIMATED: 8 MMHG
RA WIDTH: 2.74 CM
RIGHT VENTRICULAR END-DIASTOLIC DIMENSION: 3.18 CM
RV TB RVSP: 11 MMHG
SINUS: 2.56 CM
SODIUM SERPL-SCNC: 141 MMOL/L (ref 136–145)
STJ: 2.13 CM
TDI LATERAL: 0.08 M/S
TDI SEPTAL: 0.07 M/S
TDI: 0.08 M/S
TR MAX PG: 26 MMHG
TRICUSPID ANNULAR PLANE SYSTOLIC EXCURSION: 2.48 CM
TRIGL SERPL-MCNC: 46 MG/DL (ref 30–150)
TROPONIN I SERPL DL<=0.01 NG/ML-MCNC: 0.07 NG/ML (ref 0–0.03)
TROPONIN I SERPL DL<=0.01 NG/ML-MCNC: 0.1 NG/ML (ref 0–0.03)
TROPONIN I SERPL DL<=0.01 NG/ML-MCNC: 0.14 NG/ML (ref 0–0.03)
TSH SERPL DL<=0.005 MIU/L-ACNC: 1.57 UIU/ML (ref 0.4–4)
TV PEAK GRADIENT: 2 MMHG
TV REST PULMONARY ARTERY PRESSURE: 34 MMHG
Z-SCORE OF LEFT VENTRICULAR DIMENSION IN END DIASTOLE: -1.96
Z-SCORE OF LEFT VENTRICULAR DIMENSION IN END SYSTOLE: -0.53

## 2023-08-15 PROCEDURE — 25000003 PHARM REV CODE 250: Performed by: INTERNAL MEDICINE

## 2023-08-15 PROCEDURE — 25500020 PHARM REV CODE 255: Performed by: INTERNAL MEDICINE

## 2023-08-15 PROCEDURE — 84443 ASSAY THYROID STIM HORMONE: CPT

## 2023-08-15 PROCEDURE — 93005 ELECTROCARDIOGRAM TRACING: CPT | Mod: 59

## 2023-08-15 PROCEDURE — C1894 INTRO/SHEATH, NON-LASER: HCPCS | Performed by: INTERNAL MEDICINE

## 2023-08-15 PROCEDURE — 25000003 PHARM REV CODE 250: Performed by: EMERGENCY MEDICINE

## 2023-08-15 PROCEDURE — 99152 MOD SED SAME PHYS/QHP 5/>YRS: CPT | Performed by: INTERNAL MEDICINE

## 2023-08-15 PROCEDURE — 63600175 PHARM REV CODE 636 W HCPCS: Performed by: EMERGENCY MEDICINE

## 2023-08-15 PROCEDURE — G0378 HOSPITAL OBSERVATION PER HR: HCPCS

## 2023-08-15 PROCEDURE — 99205 PR OFFICE/OUTPT VISIT, NEW, LEVL V, 60-74 MIN: ICD-10-PCS | Mod: 25,,, | Performed by: INTERNAL MEDICINE

## 2023-08-15 PROCEDURE — 96374 THER/PROPH/DIAG INJ IV PUSH: CPT | Mod: 59

## 2023-08-15 PROCEDURE — 80061 LIPID PANEL: CPT

## 2023-08-15 PROCEDURE — 99152 MOD SED SAME PHYS/QHP 5/>YRS: CPT | Mod: ,,, | Performed by: INTERNAL MEDICINE

## 2023-08-15 PROCEDURE — 93010 ELECTROCARDIOGRAM REPORT: CPT | Mod: ,,, | Performed by: INTERNAL MEDICINE

## 2023-08-15 PROCEDURE — 93458 PR CATH PLACE/CORON ANGIO, IMG SUPER/INTERP,W LEFT HEART VENTRICULOGRAPHY: ICD-10-PCS | Mod: 26,51,, | Performed by: INTERNAL MEDICINE

## 2023-08-15 PROCEDURE — 99205 OFFICE O/P NEW HI 60 MIN: CPT | Mod: 25,,, | Performed by: INTERNAL MEDICINE

## 2023-08-15 PROCEDURE — 80053 COMPREHEN METABOLIC PANEL: CPT

## 2023-08-15 PROCEDURE — C1769 GUIDE WIRE: HCPCS | Performed by: INTERNAL MEDICINE

## 2023-08-15 PROCEDURE — 36252 PR INS CATH REN ART 1ST BILAT: ICD-10-PCS | Mod: ,,, | Performed by: INTERNAL MEDICINE

## 2023-08-15 PROCEDURE — 25000003 PHARM REV CODE 250

## 2023-08-15 PROCEDURE — 84484 ASSAY OF TROPONIN QUANT: CPT | Performed by: EMERGENCY MEDICINE

## 2023-08-15 PROCEDURE — 93458 L HRT ARTERY/VENTRICLE ANGIO: CPT | Performed by: INTERNAL MEDICINE

## 2023-08-15 PROCEDURE — 36252 INS CATH REN ART 1ST BILAT: CPT | Mod: ,,, | Performed by: INTERNAL MEDICINE

## 2023-08-15 PROCEDURE — 63600175 PHARM REV CODE 636 W HCPCS: Performed by: INTERNAL MEDICINE

## 2023-08-15 PROCEDURE — 99152 PR MOD CONSCIOUS SEDATION, SAME PHYS, 5+ YRS, FIRST 15 MIN: ICD-10-PCS | Mod: ,,, | Performed by: INTERNAL MEDICINE

## 2023-08-15 PROCEDURE — 93458 L HRT ARTERY/VENTRICLE ANGIO: CPT | Mod: 26,51,, | Performed by: INTERNAL MEDICINE

## 2023-08-15 PROCEDURE — C1887 CATHETER, GUIDING: HCPCS | Performed by: INTERNAL MEDICINE

## 2023-08-15 PROCEDURE — 84484 ASSAY OF TROPONIN QUANT: CPT | Mod: 91

## 2023-08-15 PROCEDURE — 36252 INS CATH REN ART 1ST BILAT: CPT | Performed by: INTERNAL MEDICINE

## 2023-08-15 PROCEDURE — 93010 EKG 12-LEAD: ICD-10-PCS | Mod: ,,, | Performed by: INTERNAL MEDICINE

## 2023-08-15 RX ORDER — DIPHENHYDRAMINE HCL 25 MG
25 CAPSULE ORAL ONCE
Status: CANCELLED | OUTPATIENT
Start: 2023-08-15

## 2023-08-15 RX ORDER — HYDROCHLOROTHIAZIDE 25 MG/1
25 TABLET ORAL DAILY
Status: DISCONTINUED | OUTPATIENT
Start: 2023-08-15 | End: 2023-08-15 | Stop reason: HOSPADM

## 2023-08-15 RX ORDER — CARVEDILOL 3.12 MG/1
3.12 TABLET ORAL 2 TIMES DAILY
Status: DISCONTINUED | OUTPATIENT
Start: 2023-08-15 | End: 2023-08-15 | Stop reason: HOSPADM

## 2023-08-15 RX ORDER — NITROGLYCERIN 0.4 MG/1
0.4 TABLET SUBLINGUAL EVERY 5 MIN PRN
Status: DISCONTINUED | OUTPATIENT
Start: 2023-08-15 | End: 2023-08-15 | Stop reason: HOSPADM

## 2023-08-15 RX ORDER — NITROGLYCERIN 20 MG/100ML
INJECTION INTRAVENOUS
Status: DISCONTINUED | OUTPATIENT
Start: 2023-08-15 | End: 2023-08-15 | Stop reason: HOSPADM

## 2023-08-15 RX ORDER — ENOXAPARIN SODIUM 100 MG/ML
40 INJECTION SUBCUTANEOUS EVERY 24 HOURS
Status: DISCONTINUED | OUTPATIENT
Start: 2023-08-15 | End: 2023-08-15 | Stop reason: HOSPADM

## 2023-08-15 RX ORDER — ACETAMINOPHEN 325 MG/1
650 TABLET ORAL EVERY 4 HOURS PRN
Status: DISCONTINUED | OUTPATIENT
Start: 2023-08-15 | End: 2023-08-15

## 2023-08-15 RX ORDER — LIDOCAINE HYDROCHLORIDE 10 MG/ML
INJECTION, SOLUTION EPIDURAL; INFILTRATION; INTRACAUDAL; PERINEURAL
Status: DISCONTINUED | OUTPATIENT
Start: 2023-08-15 | End: 2023-08-15 | Stop reason: HOSPADM

## 2023-08-15 RX ORDER — MIDAZOLAM HYDROCHLORIDE 1 MG/ML
INJECTION, SOLUTION INTRAMUSCULAR; INTRAVENOUS
Status: DISCONTINUED | OUTPATIENT
Start: 2023-08-15 | End: 2023-08-15 | Stop reason: HOSPADM

## 2023-08-15 RX ORDER — METOPROLOL SUCCINATE 50 MG/1
100 TABLET, EXTENDED RELEASE ORAL DAILY
Status: DISCONTINUED | OUTPATIENT
Start: 2023-08-15 | End: 2023-08-15

## 2023-08-15 RX ORDER — HEPARIN SODIUM 1000 [USP'U]/ML
INJECTION, SOLUTION INTRAVENOUS; SUBCUTANEOUS
Status: DISCONTINUED | OUTPATIENT
Start: 2023-08-15 | End: 2023-08-15 | Stop reason: HOSPADM

## 2023-08-15 RX ORDER — POLYETHYLENE GLYCOL 3350 17 G/17G
17 POWDER, FOR SOLUTION ORAL DAILY
Status: DISCONTINUED | OUTPATIENT
Start: 2023-08-15 | End: 2023-08-15 | Stop reason: HOSPADM

## 2023-08-15 RX ORDER — HYDROCODONE BITARTRATE AND ACETAMINOPHEN 5; 325 MG/1; MG/1
1 TABLET ORAL EVERY 4 HOURS PRN
Status: DISCONTINUED | OUTPATIENT
Start: 2023-08-15 | End: 2023-08-15 | Stop reason: HOSPADM

## 2023-08-15 RX ORDER — ATROPINE SULFATE 0.1 MG/ML
0.5 INJECTION INTRAVENOUS
Status: DISCONTINUED | OUTPATIENT
Start: 2023-08-15 | End: 2023-08-15 | Stop reason: HOSPADM

## 2023-08-15 RX ORDER — MAG HYDROX/ALUMINUM HYD/SIMETH 200-200-20
30 SUSPENSION, ORAL (FINAL DOSE FORM) ORAL 4 TIMES DAILY PRN
Status: DISCONTINUED | OUTPATIENT
Start: 2023-08-15 | End: 2023-08-15 | Stop reason: HOSPADM

## 2023-08-15 RX ORDER — FENTANYL CITRATE 50 UG/ML
INJECTION, SOLUTION INTRAMUSCULAR; INTRAVENOUS
Status: DISCONTINUED | OUTPATIENT
Start: 2023-08-15 | End: 2023-08-15 | Stop reason: HOSPADM

## 2023-08-15 RX ORDER — HYDRALAZINE HYDROCHLORIDE 20 MG/ML
10 INJECTION INTRAMUSCULAR; INTRAVENOUS EVERY 6 HOURS PRN
Status: DISCONTINUED | OUTPATIENT
Start: 2023-08-15 | End: 2023-08-15 | Stop reason: HOSPADM

## 2023-08-15 RX ORDER — NALOXONE HCL 0.4 MG/ML
0.02 VIAL (ML) INJECTION
Status: DISCONTINUED | OUTPATIENT
Start: 2023-08-15 | End: 2023-08-15 | Stop reason: HOSPADM

## 2023-08-15 RX ORDER — PAROXETINE 10 MG/5ML
10 SUSPENSION ORAL DAILY
Status: DISCONTINUED | OUTPATIENT
Start: 2023-08-15 | End: 2023-08-15 | Stop reason: HOSPADM

## 2023-08-15 RX ORDER — ONDANSETRON 8 MG/1
8 TABLET, ORALLY DISINTEGRATING ORAL EVERY 8 HOURS PRN
Status: DISCONTINUED | OUTPATIENT
Start: 2023-08-15 | End: 2023-08-15

## 2023-08-15 RX ORDER — HYDRALAZINE HYDROCHLORIDE 20 MG/ML
10 INJECTION INTRAMUSCULAR; INTRAVENOUS
Status: COMPLETED | OUTPATIENT
Start: 2023-08-15 | End: 2023-08-15

## 2023-08-15 RX ORDER — TALC
6 POWDER (GRAM) TOPICAL NIGHTLY PRN
Status: DISCONTINUED | OUTPATIENT
Start: 2023-08-15 | End: 2023-08-15 | Stop reason: HOSPADM

## 2023-08-15 RX ORDER — LOSARTAN POTASSIUM 25 MG/1
50 TABLET ORAL 2 TIMES DAILY
Status: DISCONTINUED | OUTPATIENT
Start: 2023-08-15 | End: 2023-08-15 | Stop reason: HOSPADM

## 2023-08-15 RX ORDER — SODIUM CHLORIDE 9 MG/ML
INJECTION, SOLUTION INTRAVENOUS CONTINUOUS
Status: DISCONTINUED | OUTPATIENT
Start: 2023-08-15 | End: 2023-08-15

## 2023-08-15 RX ORDER — CLOPIDOGREL 300 MG/1
600 TABLET, FILM COATED ORAL ONCE
Status: COMPLETED | OUTPATIENT
Start: 2023-08-15 | End: 2023-08-15

## 2023-08-15 RX ORDER — CLOPIDOGREL BISULFATE 75 MG/1
75 TABLET ORAL DAILY
Status: DISCONTINUED | OUTPATIENT
Start: 2023-08-16 | End: 2023-08-15

## 2023-08-15 RX ORDER — MORPHINE SULFATE 4 MG/ML
2 INJECTION, SOLUTION INTRAMUSCULAR; INTRAVENOUS EVERY 10 MIN PRN
Status: DISCONTINUED | OUTPATIENT
Start: 2023-08-15 | End: 2023-08-15 | Stop reason: HOSPADM

## 2023-08-15 RX ORDER — HYDROCHLOROTHIAZIDE 12.5 MG/1
12.5 TABLET ORAL DAILY
Qty: 30 TABLET | Refills: 0 | Status: SHIPPED | OUTPATIENT
Start: 2023-08-15 | End: 2023-09-18 | Stop reason: SDUPTHER

## 2023-08-15 RX ORDER — VERAPAMIL HYDROCHLORIDE 2.5 MG/ML
INJECTION, SOLUTION INTRAVENOUS
Status: DISCONTINUED | OUTPATIENT
Start: 2023-08-15 | End: 2023-08-15 | Stop reason: HOSPADM

## 2023-08-15 RX ORDER — SODIUM CHLORIDE 0.9 % (FLUSH) 0.9 %
10 SYRINGE (ML) INJECTION
Status: DISCONTINUED | OUTPATIENT
Start: 2023-08-15 | End: 2023-08-15 | Stop reason: HOSPADM

## 2023-08-15 RX ORDER — NAPROXEN SODIUM 220 MG/1
81 TABLET, FILM COATED ORAL DAILY
Status: DISCONTINUED | OUTPATIENT
Start: 2023-08-15 | End: 2023-08-15

## 2023-08-15 RX ORDER — LOSARTAN POTASSIUM 25 MG/1
50 TABLET ORAL 2 TIMES DAILY
Status: DISCONTINUED | OUTPATIENT
Start: 2023-08-15 | End: 2023-08-15

## 2023-08-15 RX ORDER — ASPIRIN 325 MG
325 TABLET ORAL
Status: COMPLETED | OUTPATIENT
Start: 2023-08-15 | End: 2023-08-15

## 2023-08-15 RX ORDER — PROCHLORPERAZINE EDISYLATE 5 MG/ML
5 INJECTION INTRAMUSCULAR; INTRAVENOUS EVERY 6 HOURS PRN
Status: DISCONTINUED | OUTPATIENT
Start: 2023-08-15 | End: 2023-08-15 | Stop reason: HOSPADM

## 2023-08-15 RX ORDER — ACETAMINOPHEN 325 MG/1
650 TABLET ORAL EVERY 4 HOURS PRN
Status: DISCONTINUED | OUTPATIENT
Start: 2023-08-15 | End: 2023-08-15 | Stop reason: HOSPADM

## 2023-08-15 RX ORDER — ONDANSETRON 8 MG/1
8 TABLET, ORALLY DISINTEGRATING ORAL EVERY 8 HOURS PRN
Status: DISCONTINUED | OUTPATIENT
Start: 2023-08-15 | End: 2023-08-15 | Stop reason: HOSPADM

## 2023-08-15 RX ADMIN — CARVEDILOL 3.12 MG: 3.12 TABLET, FILM COATED ORAL at 08:08

## 2023-08-15 RX ADMIN — HYDROCHLOROTHIAZIDE 25 MG: 25 TABLET ORAL at 08:08

## 2023-08-15 RX ADMIN — ASPIRIN 81 MG CHEWABLE TABLET 81 MG: 81 TABLET CHEWABLE at 08:08

## 2023-08-15 RX ADMIN — SODIUM CHLORIDE: 9 INJECTION, SOLUTION INTRAVENOUS at 08:08

## 2023-08-15 RX ADMIN — PAROXETINE HYDROCHLORIDE 10 MG: 10 SUSPENSION ORAL at 09:08

## 2023-08-15 RX ADMIN — CLOPIDOGREL BISULFATE 600 MG: 300 TABLET, FILM COATED ORAL at 08:08

## 2023-08-15 RX ADMIN — SODIUM CHLORIDE 1400 ML: 9 INJECTION, SOLUTION INTRAVENOUS at 12:08

## 2023-08-15 RX ADMIN — ASPIRIN 325 MG ORAL TABLET 325 MG: 325 PILL ORAL at 01:08

## 2023-08-15 RX ADMIN — HYDRALAZINE HYDROCHLORIDE 10 MG: 20 INJECTION INTRAMUSCULAR; INTRAVENOUS at 12:08

## 2023-08-15 RX ADMIN — LOSARTAN POTASSIUM 50 MG: 25 TABLET, FILM COATED ORAL at 08:08

## 2023-08-15 NOTE — SUBJECTIVE & OBJECTIVE
Past Medical History:   Diagnosis Date    Herpes simplex without mention of complication     Hypertension     MVP (mitral valve prolapse)        Past Surgical History:   Procedure Laterality Date    DILATION AND CURETTAGE OF UTERUS      VAGINAL DELIVERY      x4 normal       Review of patient's allergies indicates:  No Known Allergies    No current facility-administered medications on file prior to encounter.     Current Outpatient Medications on File Prior to Encounter   Medication Sig    bisoprolol (ZEBETA) 5 MG tablet Take 5 mg by mouth once daily.    losartan (COZAAR) 50 MG tablet TK 1 T PO BID    paroxetine (PAXIL) 10 MG tablet Take 10 mg by mouth every morning.    aspirin 81 MG Chew Take 81 mg by mouth once daily.    cetirizine (ZYRTEC) 10 MG tablet Take 10 mg by mouth once daily.    metoprolol tartrate (LOPRESSOR) 25 MG tablet Take 25 mg by mouth 2 (two) times daily.    multivitamin (THERAGRAN) per tablet Take 1 tablet by mouth once daily.    norgestimate-ethinyl estradiol (ORTHO TRI-CYCLEN,TRI-SPRINTEC) 0.18/0.215/0.25 mg-35 mcg (28) tablet Take 1 tablet by mouth once daily.    pantoprazole (PROTONIX) 40 MG tablet Please take 1 tablet by mouth every 12 hours when you have epigastric discomfort, then 1 tablet every day.    valacyclovir (VALTREX) 1000 MG tablet Take 2 tablets (2,000 mg total) by mouth 2 (two) times daily.     Family History       Problem Relation (Age of Onset)    Diabetes Other    Hypertension Other          Tobacco Use    Smoking status: Never    Smokeless tobacco: Never   Substance and Sexual Activity    Alcohol use: Yes     Comment: Occassionally    Drug use: No    Sexual activity: Yes     Partners: Male     Birth control/protection: None     Review of Systems   Constitutional:  Positive for chills. Negative for fatigue and fever.   HENT:  Negative for congestion and rhinorrhea.    Eyes:  Negative for photophobia and visual disturbance.   Respiratory:  Positive for shortness of breath.  Negative for cough.    Cardiovascular:  Positive for chest pain (Burning). Negative for palpitations and leg swelling.   Gastrointestinal:  Negative for abdominal pain, diarrhea, nausea and vomiting.   Genitourinary:  Negative for dysuria, frequency and urgency.   Skin:  Negative for pallor, rash and wound.   Neurological:  Positive for headaches. Negative for light-headedness.   Psychiatric/Behavioral:  Negative for confusion and decreased concentration.      Objective:     Vital Signs (Most Recent):  Temp: 97.5 °F (36.4 °C) (08/15/23 0108)  Pulse: 66 (08/15/23 0232)  Resp: 18 (08/15/23 0232)  BP: 139/74 (08/15/23 0232)  SpO2: 99 % (08/15/23 0232) Vital Signs (24h Range):  Temp:  [97.5 °F (36.4 °C)-97.7 °F (36.5 °C)] 97.5 °F (36.4 °C)  Pulse:  [52-74] 66  Resp:  [16-26] 18  SpO2:  [97 %-100 %] 99 %  BP: (137-249)/() 139/74     Weight: 74.8 kg (165 lb)  Body mass index is 27.46 kg/m².     Physical Exam  Vitals and nursing note reviewed.   Constitutional:       General: She is not in acute distress.     Appearance: She is well-developed.   HENT:      Head: Normocephalic and atraumatic.      Right Ear: External ear normal.      Left Ear: External ear normal.      Nose: Nose normal.   Eyes:      Conjunctiva/sclera: Conjunctivae normal.      Pupils: Pupils are equal, round, and reactive to light.   Cardiovascular:      Rate and Rhythm: Normal rate and regular rhythm.      Heart sounds: Murmur heard.   Pulmonary:      Effort: Pulmonary effort is normal. No respiratory distress.      Breath sounds: Normal breath sounds. No wheezing.   Abdominal:      General: Bowel sounds are normal. There is no distension.      Palpations: Abdomen is soft.      Tenderness: There is no abdominal tenderness.      Comments: No palpable hepatomegaly or splenomegaly    Musculoskeletal:         General: No tenderness. Normal range of motion.      Cervical back: Normal range of motion and neck supple.   Skin:     General: Skin is warm  and dry.   Neurological:      Mental Status: She is alert and oriented to person, place, and time.      Cranial Nerves: No cranial nerve deficit.      Motor: No weakness.      Coordination: Coordination normal. Finger-Nose-Finger Test and Heel to Shin Test normal.   Psychiatric:         Thought Content: Thought content normal.              CRANIAL NERVES     CN III, IV, VI   Pupils are equal, round, and reactive to light.       Significant Labs: All pertinent labs within the past 24 hours have been reviewed.  Recent Lab Results  (Last 5 results in the past 24 hours)        08/15/23  0104   08/14/23  2323   08/14/23  2231   08/14/23  2230   08/14/23  2222        Benzodiazepines         Negative       Methadone metabolites         Negative       Phencyclidine         Negative       Albumin   4.3             Alkaline Phosphatase   173             ALT   21             Amphetamine Screen, Ur         Negative       Anion Gap   11             Appearance, UA         Clear       AST   37             Bacteria, UA         None       Barbiturate Screen, Ur         Negative       Baso #     0.02           Basophil %     0.3           Bilirubin (UA)         Negative       BILIRUBIN TOTAL   0.3  Comment: For infants and newborns, interpretation of results should be based  on gestational age, weight and in agreement with clinical  observations.    Premature Infant recommended reference ranges:  Up to 24 hours.............<8.0 mg/dL  Up to 48 hours............<12.0 mg/dL  3-5 days..................<15.0 mg/dL  6-29 days.................<15.0 mg/dL               BNP     48  Comment: Values of less than 100 pg/ml are consistent with non-CHF populations.           BUN   15             Calcium   9.3             Chloride   106             CO2   24             Cocaine (Metab.)         Negative       Color, UA         Colorless       Creatinine   1.1             Creatinine, Urine         13.5       Differential Method     Automated            eGFR   >60             Eos #     0.1           Eosinophil %     1.8           Glucose   116             Glucose, UA         Negative       Gran # (ANC)     3.1           Gran %     42.4           Hematocrit     41.2           Hemoglobin     13.2           Hyaline Casts, UA         0       Immature Grans (Abs)     0.01  Comment: Mild elevation in immature granulocytes is non specific and   can be seen in a variety of conditions including stress response,   acute inflammation, trauma and pregnancy. Correlation with other   laboratory and clinical findings is essential.             Immature Granulocytes     0.1           Ketones, UA         Negative       Leukocytes, UA         Negative       Lymph #     3.4           Lymph %     46.1           MCH     27.9           MCHC     32.0           MCV     87           Microscopic Comment         SEE COMMENT  Comment: Other formed elements not mentioned in the report are not   present in the microscopic examination.          Mono #     0.7           Mono %     9.3           MPV     11.7           NITRITE UA         Negative       nRBC     0           Occult Blood UA         Negative       Opiate Scrn, Ur         Negative       pH, UA         7.0       Platelets     199           Potassium   4.1  Comment: Specimen slightly hemolyzed             Preg Test, Ur       Negative         PROTEIN TOTAL   8.3             Protein, UA         1+  Comment: Recommend a 24 hour urine protein or a urine   protein/creatinine ratio if globulin induced proteinuria is  clinically suspected.          Acceptable       Yes         RBC     4.73           RBC, UA         0       RDW     12.9           Sodium   141             Specific [place], UA         1.010       Specimen UA         Urine, Clean Catch       Marijuana (THC) Metabolite         Negative       Toxicology Information         SEE COMMENT  Comment: This screen includes the following classes of drugs at the listed    cut-off:    Benzodiazepines 200 ng/ml  Methadone 300 ng/ml  Cocaine metabolite 300 ng/ml  Opiates 300 ng/ml  Barbiturates 200 ng/ml  Amphetamines 1000 ng/ml  Marijuana metabs (THC) 50 ng/ml  Phencyclidine (PCP) 25 ng/ml    This is a screening test. If results do not correlate with clinical   presentation, then a confirmatory send out test is advised.     This report is intended for use in clinical monitoring and management   of   patients. It is not intended for use in employment related drug   testing.         Troponin I 0.100  Comment: The reference interval for Troponin I represents the 99th percentile   cutoff   for our facility and is consistent with 3rd generation assay   performance.       <0.006  Comment: The reference interval for Troponin I represents the 99th percentile   cutoff   for our facility and is consistent with 3rd generation assay   performance.             UROBILINOGEN UA         Negative       WBC, UA         0       WBC     7.31                                  Significant Imaging: I have reviewed all pertinent imaging results/findings within the past 24 hours.  Imaging Results              CT Head Without Contrast (Final result)  Result time 08/14/23 22:53:16      Final result by Rocio Hawkins MD (08/14/23 22:53:16)                   Impression:      No acute intracranial abnormalities identified.      Electronically signed by: Rocio Hawkins MD  Date:    08/14/2023  Time:    22:53               Narrative:    EXAMINATION:  CT HEAD WITHOUT CONTRAST    CLINICAL HISTORY:  Headache, chronic, new features or increased frequency;    TECHNIQUE:  Low dose axial images were obtained through the head.  Coronal and sagittal reformations were also performed. Contrast was not administered.    COMPARISON:  CT head 02/20/2023.    FINDINGS:  No evidence of acute/recent major vascular distribution cerebral infarction, intraparenchymal hemorrhage, or intra-axial space occupying lesion. The ventricular  system is normal in size and configuration with no evidence of hydrocephalus. No effacement of the skull-base cisterns. No abnormal extra-axial fluid collections or blood products. Visualized paranasal sinuses and mastoid air cells are clear. The calvarium shows no significant abnormality.                                       X-Ray Chest AP Portable (Final result)  Result time 08/14/23 22:42:22      Final result by Rocio Hawkins MD (08/14/23 22:42:22)                   Impression:      No acute cardiopulmonary process identified.      Electronically signed by: Rocio Hawkins MD  Date:    08/14/2023  Time:    22:42               Narrative:    EXAMINATION:  XR CHEST AP PORTABLE    CLINICAL HISTORY:  shortness of breath;    TECHNIQUE:  Single frontal view of the chest was performed.    COMPARISON:  None    FINDINGS:  Cardiac silhouette is normal in size.  Lungs are symmetrically expanded.  No evidence of focal consolidative process, pneumothorax, or significant pleural effusion.  No acute osseous abnormality identified.

## 2023-08-15 NOTE — BRIEF OP NOTE
[facility] - Cath Lab  Brief Operative Note     SUMMARY     Surgery Date: 8/15/2023     Surgeon(s) and Role:     * Zane Jacob MD - Primary    Assisting Surgeon: None    Pre-op Diagnosis:  Chest pain [R07.9]  Elevated troponin [R77.8]  Hypertensive emergency [I16.1]    Post-op Diagnosis:  Post-Op Diagnosis Codes:     * Chest pain [R07.9]     * Elevated troponin [R77.8]     * Hypertensive emergency [I16.1]    Procedure(s) (LRB):  Left heart cath (Left)  Angiogram, Renal Arteries, Bilateral (Bilateral)    Anesthesia: RN IV Sedation    Description of the findings of the procedure: uneventful LHC/cor angio/bilat selective renal angio/R rad vasband.  Normal cors/renals bilaterally.    Findings/Key Components:  LVEDP: 12mmHg  LVEF: 65-70% by echo    Dominance: Right  LM: normal  LAD: normal  LCx: normal  RCA: normal    R renal: normal  L renal: normal    Hemostasis:  R Radial band    Impression:  CP/HTN emerg with elev trop  Normal cors/LV fxn  Normal renal arteries bilaterally  R rad vasband for hemostasis    Plan:  Cont med rx  Stop ASA/Plavix  Titrate antihtn meds  Home today  Follow up with Dr. Jacob  Consider as a candidate for renal denervation in the future.    Estimated Blood Loss: <50cc         Specimens: None

## 2023-08-15 NOTE — PLAN OF CARE
08/15/23 1335   Final Note   Assessment Type Final Discharge Note   Anticipated Discharge Disposition Home   Hospital Resources/Appts/Education Provided Appointments scheduled and added to AVS   Post-Acute Status   Post-Acute Authorization Other   Other Status No Post-Acute Service Needs     Pts nurse Julian notified that the pt can d/c from CM standpoint

## 2023-08-15 NOTE — DISCHARGE INSTRUCTIONS
D/c papers given at this time, pt verbalizes understanding.  Resp even ul, skin warm and dry, NAD noted.  Denies further questions.  Ambulates with steady gait to ED exit, pt d/c to self.   Thank you for coming to our Emergency Department today. It is important to remember that some problems are difficult to diagnose and may not be found during your Emergency Department visit. Be sure to follow up with your primary care doctor and review all labs/imaging/tests that were performed during this visit with them. Some labs/tests may be outside of the normal range and require non-emergent follow-up and further investigation to help diagnose/exclude/prevent complications or other medical conditions.    If you do not have a primary care doctor, you may contact the one listed on your discharge paperwork or you may also call the [facility] Appointment Desk at [phone] to schedule an appointment and establish care with one. It is important to your health that you have a primary care doctor.    Medicaid Escalation Line:   [phone] - Please contact this number if you are having difficulty getting follow up with a Primary Care Provider or Speciality Provider.     Please take all medications as directed. All medications may potentially have side-effects and it is impossible to predict which medications may give you side-effects or what side-effects (if any) they will give you.. If you feel that you are having a negative effect or side-effect of any medication you should immediately stop taking them and seek medical attention. If you feel that you are having a life-threatening reaction call [phone].    Return to the ER with any questions/concerns, new/concerning symptoms, worsening or failure to improve.     Do not drive, swim, climb to height, take a bath or make any important decisions for 24 hours if you have received any pain medications, sedatives or mood altering drugs during your ER visit.        BELOW THIS LINE ONLY APPLIES IF YOU HAVE A COVID TEST PENDING OR IF YOU HAVE BEEN DIAGNOSED WITH COVID:  Please access [facility] to review the results of your test. Until the results of your COVID test  return, you should isolate yourself so as not to potentially spread illness to others.   If your COVID test returns positive, you should isolate yourself so as not to spread illness to others. After five full days, if you are feeling better and you have not had fever for 24 hours, you can return to your typical daily activities, but you must wear a mask around others for an additional 5 days.   If your COVID test returns negative and you are either unvaccinated or more than six months out from your two-dose vaccine and are not yet boosted, you should still quarantine for 5 full days followed by strict mask use for an additional 5 full days.   If your COVID test returns negative and you have received your 2-dose initial vaccine as well as a booster, you should continue strict mask use for 10 full days after the exposure.  For all those exposed, best practice includes a test at day 5 after the exposure. This can be a home test or a test through one of the many testing centers throughout our community.   Masking is always advised to limit the spread of COVID. Cdc.gov is an excellent site to obtain the latest up to date recommendations regarding COVID and COVID testing.     CDC Testing and Quarantine Guidelines for patients with exposure to a known-positive COVID-19 person:  A close exposure is defined as anyone who has had an exposure (masked or unmasked) to a known COVID -19 positive person within 6 feet of someone for a cumulative total of 15 minutes or more over a 24-hour period.   Vaccinated and/or if you recently had a positive covid test within 90 days do NOT need to quarantine after contact with someone who had COVID-19 unless you develop symptoms.   Fully vaccinated people who have not had a positive test within 90 days, should get tested 3-5 days after their exposure, even if they don't have symptoms and wear a mask indoors in public for 14 days following exposure or until their test result is  negative.      Unvaccinated and/or NOT had a positive test within 90 days and meet close exposure  You are required by CDC guidelines to quarantine for at least 5 days from time of exposure followed by 5 days of strict masking. It is recommended, but not required to test after 5 days, unless you develop symptoms, in which case you should test at that time.  If you get tested after 5 days and your test is positive, your 5 day period of isolation starts the day of the positive test.    If your exposure does not meet the above definition, you can return to your normal daily activities to include social distancing, wearing a mask and frequent handwashing.      Here is a link to guidance from the CDC:  https://www.cdc.gov/media/releases/2021/s1227-isolation-quarantine-guidance.html      [facility]  Health Testing Sites:  https://ldh.[place].gov/page/3934      [facility] website with testing locations and guidance:  [email or web address]

## 2023-08-15 NOTE — ED TRIAGE NOTES
Patient presents to ED via private transportation with complaints of headache and high blood pressure. Patient states that she went to urgent care on Saturday for same issue and was told that it her headache was due to migraines.

## 2023-08-15 NOTE — NURSING
[facility]  Nurses Note -- 4 Eyes      8/15/2023       Skin assessed on: Admit      [x] No Pressure Injuries Present    [x]Prevention Measures Documented    [] Yes LDA  for Pressure Injury Previously documented     [] Yes New Pressure Injury Discovered   [] LDA for New Pressure Injury Added      Attending RN:  Julian Pearson, RN     Second RN:  Elsa MCKEON RN

## 2023-08-15 NOTE — NURSING
Received report from the cath lab, kathrine Radial accessed, no interventions, pt tolerated procedure well. Will await pt's arrival.

## 2023-08-15 NOTE — PLAN OF CARE
08/15/23 1128   Discharge Planning   Assessment Type Discharge Planning Brief Assessment   Resource/Environmental Concerns none   Support Systems Spouse/significant other;Children;Family members   Equipment Currently Used at Home none   Current Living Arrangements home   Patient/Family Anticipates Transition to home   Patient/Family Anticipated Services at Transition none   DME Needed Upon Discharge  none   Discharge Plan A Home with family  (with follow up)       [id number] - [place], [place] [facility] [address] [facility] [address] [address] [address]  [place] [address]  Phone: [phone] Fax: [phone]

## 2023-08-15 NOTE — HPI
Racquel MALDONADO Page 47 y.o. female with HTN, migraine headaches, GERD presents to the hospital with a chief complaint of headache.  Acute onset 1 week ago patient states over the last 30 meds it as become worse.  Patient endorses she was evaluated at an urgent care on Saturday and was diagnosed with migraine.  Patient believes her headache is due to high blood pressure.  Associated symptoms include shortness of breath, weight gain. She further endorses she attempted to work out today, which exacerbated her complaints. She reports recent changes in her medications, noting her losartan was doubled, and she was taken off of her amlodipine.  Patient denied any attempted self-treatment.  Denies N/V/D, numbness, weakness, visual disturbance, chest pain or other associated symptoms.  Patient was initially treated in the ED and ready for discharge however after her walk test her BP was suddenly elevated again and patient began complaining of heat from my chest staunchly denies that it was painful.  New EKG was obtained and troponin drawn with sudden elevation to 0.1.  Patient was placed in observation for ACS rule out

## 2023-08-15 NOTE — NURSING TRANSFER
Nursing Transfer Note      8/15/2023   11:09 AM    Reason patient is being transferred: hypertensive emergency/ elevated troponin    Transfer From: ED    Transfer via bed    Transfer with cardiac monitoring    Transported by Cath lab nurse    Medicines sent: none    Any special needs or follow-up needed: cath lab protocol    Patient belongings transferred with patient: Yes    Chart send with patient: Yes    8/15/23 @ 1045 Patient reassessed at:     Upon arrival to floor: cardiac monitor applied, patient oriented to room, call bell in reach, and bed in lowest position

## 2023-08-15 NOTE — HPI
47 y.o. female with HTN, migraine headaches, GERD presents to the hospital with a chief complaint of headache.   Patient endorses she was evaluated at an urgent care on Saturday and was diagnosed with migraine.  Patient believes her headache is due to high blood pressure.  Associated symptoms include shortness of breath, weight gain. She further endorses she attempted to work out today, which exacerbated her complaints. She reports recent changes in her medications, noting her losartan was doubled, and she was taken off of her amlodipine.  Patient denied any attempted self-treatment.  Denies N/V/D, numbness, weakness, visual disturbance, chest pain or other associated symptoms.  Patient was initially treated in the ED and ready for discharge however after her walk test her BP was suddenly elevated again and patient began complaining of heat from my chest staunchly denies that it was painful.  New EKG was obtained and troponin drawn with sudden elevation to 0.1.  Patient was placed in observation for ACS rule out.    Pt follows with Dr. Camacho, seen 7/2023.    Cardiology consulted for elev trop.    The patient is a very pleasant 47-year-old woman with a history of hypertension presenting to the emergency room with complaint of headache and chest warmth and tightness.  Her blood pressure was markedly elevated.  This was controlled in the emergency room, but she had a spike in her blood pressure again.  Her troponin has risen to 0.14.  EKG is nondiagnostic.  Her blood pressure is currently controlled and she is pain-free.  She recently had her amlodipine stopped for lower extremity edema and her losartan was doubled to 50 mg b.i.d..  She tells me her lower extremity edema abated off of the amlodipine.  At this point, given her chest discomfort and elevated troponin, I can not entirely exclude ACS.  I am also concerned that she may have renal artery stenosis.  I have discussed coronary and renal angiography with the  patient and she agrees to proceed.  Permit has been signed.

## 2023-08-15 NOTE — DISCHARGE SUMMARY
[facility] Medicine  Discharge Summary      Patient Name: Racquel Orozco  MRN: [id number]  [facility]: [id number]  Patient Class: OP- Observation  Admission Date: 8/14/2023  Hospital Length of Stay: 0 days  Discharge Date and Time:  08/15/2023 1:35 PM  Attending Physician: Rhina Cooley MD   Discharging Provider: Rhina Cooley MD  Primary Care Provider: Yony Watt MD    Primary Care Team:  HOSP MED 4    HPI:   Racquel Orozco 47 y.o. female with HTN, migraine headaches, GERD presents to the hospital with a chief complaint of headache.  Acute onset 1 week ago patient states over the last 30 meds it as become worse.  Patient endorses she was evaluated at an urgent care on Saturday and was diagnosed with migraine.  Patient believes her headache is due to high blood pressure.  Associated symptoms include shortness of breath, weight gain. She further endorses she attempted to work out today, which exacerbated her complaints. She reports recent changes in her medications, noting her losartan was doubled, and she was taken off of her amlodipine.  Patient denied any attempted self-treatment.  Denies N/V/D, numbness, weakness, visual disturbance, chest pain or other associated symptoms.  Patient was initially treated in the ED and ready for discharge however after her walk test her BP was suddenly elevated again and patient began complaining of heat from my chest staunchly denies that it was painful.  New EKG was obtained and troponin drawn with sudden elevation to 0.1.  Patient was placed in observation for ACS rule out      Procedure(s) (LRB):  Left heart cath (Left)  Angiogram, Renal Arteries, Bilateral (Bilateral)      Hospital Course:   Ms Racquel Orozco was placed in observation with hypertensive emergency, headache, and elevated troponin. Recently discontinued amlodipine due to lower extremity edema, continued bisoprolol, and increased losartan to 50mg BID. BP controlled without gtt.  Cardiology consulted. [facility] normal. Headache resolved. Added HCTZ 12.5mg daily. BP improved. Stable for discharge to home with HCTZ and continue other BP Rx. Follow up with Cardiologist.        Goals of Care Treatment Preferences:  Code Status: Full Code      Consults:   Consults (From admission, onward)        Status Ordering Provider     Inpatient consult to Registered Dietitian/Nutritionist  Once        Provider:  (Not yet assigned)    Ordered CLIFFORD JEFFRIES     Inpatient consult to Social Work  Once        Provider:  (Not yet assigned)    Completed ZANE SANTAMARIA     Inpatient consult to Cardiology  Once        Provider:  Zane Santamaria MD    Completed JOHNNY WYATT          No new Assessment & Plan notes have been filed under this hospital service since the last note was generated.  Service: Hospital Medicine    Final Active Diagnoses:    Diagnosis Date Noted POA    PRINCIPAL PROBLEM:  Hypertensive emergency [I16.1] 08/15/2023 Yes    Elevated troponin [R77.8] 08/15/2023 Yes    Generalized anxiety disorder [F41.1] 06/24/2022 Yes      Problems Resolved During this Admission:       Discharged Condition: good    Disposition: Home or Self Care    Follow Up:   Follow-up Information     Vincent Camacho MD. Go on 9/5/2023.    Specialty: Cardiology  Why: Appointment schduled for 9:45am   [facility]  Contact information:  [address] [facility] [address]  [phone]             Yony Watt MD. Go on 8/22/2023.    Specialty: Family Medicine  Why: Appointment schduled for 10:15am  Bring in inurance cards and ID  Contact information:  [address] [facility] [address]  [phone]             [facility] Emergency Dept .    Specialty: Emergency Medicine  Why: As needed, If symptoms worsen  Contact information:  [address] [place] [address] [phone]           [facility] .    Specialty: Neurology  Contact information:  [address]  [facility] [address]  [phone]                     Patient Instructions:      Diet Adult Regular     Order Specific Question Answer Comments   Na restriction, if any: 2gNa      Notify your health care provider if you experience any of the following:  temperature >100.4     Notify your health care provider if you experience any of the following:  persistent nausea and vomiting or diarrhea     Notify your health care provider if you experience any of the following:  severe uncontrolled pain     Notify your health care provider if you experience any of the following:  redness, tenderness, or signs of infection (pain, swelling, redness, odor or green/yellow discharge around incision site)     Notify your health care provider if you experience any of the following:  difficulty breathing or increased cough     Notify your health care provider if you experience any of the following:  severe persistent headache     Notify your health care provider if you experience any of the following:  worsening rash     Notify your health care provider if you experience any of the following:  persistent dizziness, light-headedness, or visual disturbances     Notify your health care provider if you experience any of the following:  increased confusion or weakness     Activity as tolerated       Significant Diagnostic Studies: N/A    Pending Diagnostic Studies:     None         Medications:  Reconciled Home Medications:      Medication List      START taking these medications    hydroCHLOROthiazide 12.5 MG Tab  Commonly known as: HYDRODIURIL  Take 1 tablet (12.5 mg total) by mouth once daily.        CONTINUE taking these medications    bisoprolol 5 MG tablet  Commonly known as: ZEBETA  Take 5 mg by mouth once daily.     losartan 50 MG tablet  Commonly known as: COZAAR  TK 1 T PO BID     multivitamin per tablet  Commonly known as: THERAGRAN  Take 1 tablet by mouth once daily.     paroxetine 10 MG tablet  Commonly  known as: PAXIL  Take 10 mg by mouth every morning.        STOP taking these medications    valACYclovir 1000 MG tablet  Commonly known as: VALTREX            Indwelling Lines/Drains at time of discharge:   Lines/Drains/Airways     None                 Time spent on the discharge of patient: 35 minutes         Rhina Cooley MD  Department of Hospital Medicine  [facility]

## 2023-08-15 NOTE — ED PROVIDER NOTES
"Encounter Date: 8/14/2023    SCRIBE #1 NOTE: I, Derick Dennis, am scribing for, and in the presence of,  Franny Boyle MD. I have scribed the following portions of the note - Other sections scribed: HPI, ROS, PE.       History     Chief Complaint   Patient presents with    Hypertension     Pt presents to the ED with c/o HA to crown of head with nausea x30 minutes. States pressure was in 190s at home. Endorses compliance with daily BP meds. Took 50mg losartan pta. Denies OTC meds. Right arm /121 and left arm 249/110     Racquel Orozco is a 47 y.o. female with a PMHx of HTN, migraine headaches, who presents to the ED for evaluation of a headache beginning 1 week ago, worsening about 30 minutes prior to arrival. Patient reports complaints of a headache for a week, endorsing she was evaluated at an Urgent Care clinic on Saturday and was diagnosed with a migraine. She states she believes her headache is due to her blood pressure being elevated. She additionally endorses complaints of shortness of breath. She reports she has recently gained weight, as well as noting she has "been trying to cut salt off her diet." She further endorses she attempted to work out today, which exacerbated her complaints. She reports recent changes in her medications, noting her losartan was doubled, and she was taken off of her amlodipine. She also reports being perimenopausal, having hot flashes which exacerbates her symptoms. No medications taken PTA. No alleviating or exacerbating factors noted. Denies N/V, numbness, weakness, visual disturbance, CP, or other associated symptoms. NKDA.    BP medications:  Losartan 50 mg BID  Bisprolol 5 mg daily    The history is provided by the patient. No  was used.     Review of patient's allergies indicates:  No Known Allergies  Past Medical History:   Diagnosis Date    Herpes simplex without mention of complication     Hypertension     MVP (mitral valve prolapse)      Past " Surgical History:   Procedure Laterality Date    DILATION AND CURETTAGE OF UTERUS      VAGINAL DELIVERY      x4 normal     Family History   Problem Relation Age of Onset    Diabetes Other     Hypertension Other     Cancer Neg Hx      Social History     Tobacco Use    Smoking status: Never    Smokeless tobacco: Never   Substance Use Topics    Alcohol use: Yes     Comment: Occassionally    Drug use: No     Review of Systems   Constitutional:  Positive for chills. Negative for fever.   HENT:  Negative for congestion and sore throat.    Eyes:  Negative for visual disturbance.   Respiratory:  Positive for shortness of breath. Negative for cough.    Cardiovascular:  Negative for chest pain.   Gastrointestinal:  Negative for abdominal pain, nausea and vomiting.   Genitourinary:  Negative for dysuria.   Skin:  Negative for rash.   Neurological:  Positive for headaches. Negative for weakness and numbness.   Psychiatric/Behavioral:  Negative for decreased concentration.        Physical Exam     Initial Vitals   BP Pulse Resp Temp SpO2   08/14/23 0048 08/14/23 0048 08/14/23 0048 08/14/23 2151 08/14/23 0048   (!) 187/96 (!) 57 19 97.7 °F (36.5 °C) 100 %      MAP       --                Physical Exam    Nursing note and vitals reviewed.  Constitutional: She appears well-developed and well-nourished. She is not diaphoretic. No distress.   HENT:   Head: Normocephalic.   Mouth/Throat: Oropharynx is clear and moist.   Eyes: EOM are normal. Pupils are equal, round, and reactive to light.   Neck: Neck supple.   Cardiovascular:  Normal rate and regular rhythm.           Pulmonary/Chest: Breath sounds normal.   Abdominal: Abdomen is soft. There is no abdominal tenderness.   Musculoskeletal:         General: No edema.      Cervical back: Neck supple.     Neurological: She is alert and oriented to person, place, and time. She has normal strength. No cranial nerve deficit or sensory deficit. GCS score is 15. GCS eye subscore is 4. GCS  verbal subscore is 5. GCS motor subscore is 6.   No pronator drift. No facial asymmetry. Finger to nose intact.   Skin: Skin is warm and dry.   Psychiatric: Her mood appears anxious.         ED Course   Procedures  Labs Reviewed   URINALYSIS, REFLEX TO URINE CULTURE - Abnormal; Notable for the following components:       Result Value    Color, UA Colorless (*)     Protein, UA 1+ (*)     All other components within normal limits    Narrative:     Specimen Source->Urine   DRUG SCREEN PANEL, URINE EMERGENCY - Abnormal; Notable for the following components:    Creatinine, Urine 13.5 (*)     All other components within normal limits    Narrative:     Specimen Source->Urine   COMPREHENSIVE METABOLIC PANEL - Abnormal; Notable for the following components:    Glucose 116 (*)     Alkaline Phosphatase 173 (*)     All other components within normal limits   TROPONIN I - Abnormal; Notable for the following components:    Troponin I 0.100 (*)     All other components within normal limits   COMPREHENSIVE METABOLIC PANEL - Abnormal; Notable for the following components:    Glucose 122 (*)     Alkaline Phosphatase 158 (*)     All other components within normal limits    Narrative:     Fasting   TROPONIN I - Abnormal; Notable for the following components:    Troponin I 0.143 (*)     All other components within normal limits    Narrative:     STAT, if not done in ED, then at 2nd and 6th hour from  initial draw.   TROPONIN I - Abnormal; Notable for the following components:    Troponin I 0.071 (*)     All other components within normal limits    Narrative:     STAT, if not done in ED, then at 2nd and 6th hour from  initial draw.   CBC W/ AUTO DIFFERENTIAL   B-TYPE NATRIURETIC PEPTIDE   TROPONIN I   URINALYSIS MICROSCOPIC    Narrative:     Specimen Source->Urine   TSH    Narrative:     Fasting   LIPID PANEL    Narrative:     Fasting   POCT URINE PREGNANCY        ECG Results              Repeat EKG 12-lead (Final result)  Result time  08/15/23 07:39:07      Final result by Interface, Lab In [facility] (08/15/23 07:39:07)                   Narrative:    Test Reason : R07.9,    Vent. Rate : 058 BPM     Atrial Rate : 058 BPM     P-R Int : 164 ms          QRS Dur : 076 ms      QT Int : 454 ms       P-R-T Axes : 048 021 002 degrees     QTc Int : 445 ms    Sinus bradycardia  Nonspecific T wave abnormality  Abnormal ECG  When compared with ECG of 14-AUG-2023 22:04,  No significant change was found  Confirmed by Zane Jacob MD ([id number]) on 8/15/2023 7:38:53 AM    Referred By: [email or web address]   SELF           Confirmed By:Zane Jacob MD                      Wet Read by Franny Boyle MD (08/15/23 01:25:18, [facility], Emergency Medicine)    Sinus bradycardia, rate 58 beats per minute, normal OK interval,  milliseconds, no STEMI.                                     EKG 12-lead (Final result)  Result time 08/15/23 07:38:59      Final result by Interface, Lab In [facility] (08/15/23 07:38:59)                   Narrative:    Test Reason : I10,    Vent. Rate : 063 BPM     Atrial Rate : 063 BPM     P-R Int : 146 ms          QRS Dur : 080 ms      QT Int : 416 ms       P-R-T Axes : 055 030 024 degrees     QTc Int : 425 ms    Normal sinus rhythm  Nonspecific T wave abnormality  Abnormal ECG  When compared with ECG of 21-OCT-2022 07:48,  No significant change was found  Confirmed by Zane Jacob MD ([id number]) on 8/15/2023 7:38:48 AM    Referred By: [email or web address]   SELF           Confirmed By:Zane Jacob MD                      Wet Read by Franny Boyle MD (08/14/23 22:15:38, [facility], Emergency Medicine)    Normal sinus rhythm, rate 63 beats per minute, normal OK interval,  milliseconds, no STEMI.                                  Imaging Results              CT Head Without Contrast (Final result)  Result time 08/14/23 22:53:16      Final result by Rocio Hawkins MD (08/14/23 22:53:16)                    Impression:      No acute intracranial abnormalities identified.      Electronically signed by: Rocio Hawkins MD  Date:    08/14/2023  Time:    22:53               Narrative:    EXAMINATION:  CT HEAD WITHOUT CONTRAST    CLINICAL HISTORY:  Headache, chronic, new features or increased frequency;    TECHNIQUE:  Low dose axial images were obtained through the head.  Coronal and sagittal reformations were also performed. Contrast was not administered.    COMPARISON:  CT head 02/20/2023.    FINDINGS:  No evidence of acute/recent major vascular distribution cerebral infarction, intraparenchymal hemorrhage, or intra-axial space occupying lesion. The ventricular system is normal in size and configuration with no evidence of hydrocephalus. No effacement of the skull-base cisterns. No abnormal extra-axial fluid collections or blood products. Visualized paranasal sinuses and mastoid air cells are clear. The calvarium shows no significant abnormality.                                       X-Ray Chest AP Portable (Final result)  Result time 08/14/23 22:42:22      Final result by Rocio Hawkins MD (08/14/23 22:42:22)                   Impression:      No acute cardiopulmonary process identified.      Electronically signed by: Rocio Hawkins MD  Date:    08/14/2023  Time:    22:42               Narrative:    EXAMINATION:  XR CHEST AP PORTABLE    CLINICAL HISTORY:  shortness of breath;    TECHNIQUE:  Single frontal view of the chest was performed.    COMPARISON:  None    FINDINGS:  Cardiac silhouette is normal in size.  Lungs are symmetrically expanded.  No evidence of focal consolidative process, pneumothorax, or significant pleural effusion.  No acute osseous abnormality identified.                                       Medications   melatonin tablet 6 mg (has no administration in time range)   prochlorperazine injection Soln 5 mg (has no administration in time range)   polyethylene glycol packet 17 g (17 g Oral Not Given  8/15/23 0900)   aluminum-magnesium hydroxide-simethicone 200-200-20 mg/5 mL suspension 30 mL (has no administration in time range)   acetaminophen tablet 650 mg (has no administration in time range)   naloxone 0.4 mg/mL injection 0.02 mg (has no administration in time range)   enoxaparin injection 40 mg (has no administration in time range)   nitroGLYCERIN SL tablet 0.4 mg (has no administration in time range)   hydrALAZINE injection 10 mg (has no administration in time range)   paroxetine 10 mg/5 mL suspension 10 mg (10 mg Oral Given 8/15/23 0900)   losartan tablet 50 mg (50 mg Oral Given 8/15/23 0806)   carvediloL tablet 3.125 mg (3.125 mg Oral Given 8/15/23 0859)   hydroCHLOROthiazide tablet 25 mg (25 mg Oral Given 8/15/23 0806)   sodium chloride 0.9% flush 10 mL (has no administration in time range)   atropine injection 0.5 mg (has no administration in time range)   sodium chloride 0.9% bolus 250 mL 250 mL (has no administration in time range)   HYDROcodone-acetaminophen 5-325 mg per tablet 1 tablet (has no administration in time range)   morphine injection 2 mg (has no administration in time range)   ondansetron disintegrating tablet 8 mg (has no administration in time range)   sodium chloride 0.9% bolus 1,400 mL 1,400 mL (1,400 mLs Intravenous New Bag 8/15/23 1213)   cloNIDine tablet 0.2 mg (0.2 mg Oral Given 8/14/23 2219)   acetaminophen tablet 1,000 mg (1,000 mg Oral Given 8/14/23 2320)   hydrALAZINE injection 10 mg (10 mg Intravenous Given 8/15/23 0058)   aspirin tablet 325 mg (325 mg Oral Given 8/15/23 0136)   clopidogreL tablet 600 mg (600 mg Oral Given 8/15/23 0805)     Medical Decision Making:   History:   Old Medical Records: I decided to obtain old medical records.  Old Records Summarized: other records and records from clinic visits.       <> Summary of Records: External documents reviewed. On chart review, the patient was seen by Dr. IVANA moraes on July 25th.  At that time, she complained of  intermittent swelling in her legs.  She was on amlodipine 5 mg daily, bisoprolol 5 mg daily, losartan 50 mg daily.  Her amlodipine was discontinued and she was started on losartan b.i.d..  Initial Assessment:   47-year-old female with history of hypertension presents with headache, hypertension.  She reports she is been having headaches intermittently this week.  Headache worsened around 30 minutes ago.  She noticed her blood pressure was elevated at home.  She denies any visual changes, unilateral numbness or weakness.  She does endorse some shortness of breath, denies chest pain.  Similar presentation here in February.  Reports blood pressure medication recently changed due to intermittent leg swelling.  She does endorse weight gain, she is trying to decrease her salt intake.  She worked out today.  On exam, the patient is hypertensive.  Neuro exam intact.  She appears anxious.  Discussed treatment of headache, blood pressure, obtaining labs and CT.  Patient would like to treat blood pressure and declined medications for migraine headache.  Independently Interpreted Test(s):   I have ordered and independently interpreted EKG Reading(s) - see prior notes  Clinical Tests:   Lab Tests: Ordered and Reviewed  Radiological Study: Ordered and Reviewed  Medical Tests: Ordered and Reviewed          Scribe Attestation:   Scribe #1: I performed the above scribed service and the documentation accurately describes the services I performed. I attest to the accuracy of the note.        ED Course as of 08/15/23 1309   Mon Aug 14, 2023   2308 Patient reports she is feeling improved, headache has improved but still present.  I offered Compazine and Benadryl which she declined, requesting Tylenol.  Patient ambulated independently to restroom with steady gait. []   Tue Aug 15, 2023   0017 Patient reassessed.  She reports her headache has subsided.  Blood pressure still elevated but goal map has been achieved.  Discussed with  patient will add hydrochlorothiazide once daily to regimen, encouraged her to follow-up with PCP or Cardiology later this week for blood pressure check, encouraged to continue keeping blood pressure log.  Will refer to Neurology for evaluation of headaches. [LH]   0043 As patient being discharged pressure increased- now 187/96. Reports burning feeling in chest (described as feeling hot, not a pain). Will get ECG, repeat troponin, hold discharge. Will give IV hydralazine.  [LH]   0140 Patient's troponin 0.1.  I reviewed this with her, I reassessed her.  She continues to deny any chest pain.  She is endorsing some hot flashes.  EKG does not show STEMI. Suspect related to demand ischemia. The patient was given aspirin.  Current blood pressure is 145/71. [LH]   0149 Case reviewed with Daniel Albert for placement in observation.  [LH]   0520 BILIRUBIN TOTAL: 0.3 []      ED Course User Index  [] Franny Boyle MD                 Clinical Impression:   Final diagnoses:  [I10] Hypertension  [R51.9] Nonintractable headache, unspecified chronicity pattern, unspecified headache type  [R07.9] Chest pain  [R77.8] Elevated troponin (Primary)        ED Disposition Condition    Observation Stable               I, Franny Boyle MD, personally performed the services described in this documentation. All medical record entries made by the scribe were at my direction and in my presence. I have reviewed the chart and agree that the record reflects my personal performance and is accurate and complete.     This dictation has been generated using M-Modal Fluency Direct dictation; some phonetic errors may occur.        Franny Boyle MD  08/15/23 7340       Franny Boyle MD  08/15/23 5251

## 2023-08-15 NOTE — ED NOTES
Comfort and safety measures maintained. Pt denies needs or complaints at this time. Care plan discussed with pt. Call light with reach of pt, bed rails up X2, bed in low position. Patient instructed to alert nurse before attempting to get out of bed. Pt verbalizes understanding.

## 2023-08-15 NOTE — H&P
[facility] Emergency [facility] Medicine  History & Physical    Patient Name: Racquel Orozco  MRN: [id number]  Patient Class: OP- Observation  Admission Date: 8/14/2023  Attending Physician: Jasson Luna MD   Primary Care Provider: Yony Watt MD         Patient information was obtained from patient, spouse/SO, past medical records and ER records.     Subjective:     Principal Problem:<principal problem not specified>    Chief Complaint:   Chief Complaint   Patient presents with    Hypertension     Pt presents to the ED with c/o HA to crown of head with nausea x30 minutes. States pressure was in 190s at home. Endorses compliance with daily BP meds. Took 50mg losartan pta. Denies OTC meds. Right arm /121 and left arm 249/110        HPI: Racquel Orozco 47 y.o. female with HTN, migraine headaches, GERD presents to the hospital with a chief complaint of headache.  Acute onset 1 week ago patient states over the last 30 meds it as become worse.  Patient endorses she was evaluated at an urgent care on Saturday and was diagnosed with migraine.  Patient believes her headache is due to high blood pressure.  Associated symptoms include shortness of breath, weight gain. She further endorses she attempted to work out today, which exacerbated her complaints. She reports recent changes in her medications, noting her losartan was doubled, and she was taken off of her amlodipine.  Patient denied any attempted self-treatment.  Denies N/V/D, numbness, weakness, visual disturbance, chest pain or other associated symptoms.  Patient was initially treated in the ED and ready for discharge however after her walk test her BP was suddenly elevated again and patient began complaining of heat from my chest staunchly denies that it was painful.  New EKG was obtained and troponin drawn with sudden elevation to 0.1.  Patient was placed in observation for ACS rule out      Past Medical History:   Diagnosis Date    Herpes  simplex without mention of complication     Hypertension     MVP (mitral valve prolapse)        Past Surgical History:   Procedure Laterality Date    DILATION AND CURETTAGE OF UTERUS      VAGINAL DELIVERY      x4 normal       Review of patient's allergies indicates:  No Known Allergies    No current facility-administered medications on file prior to encounter.     Current Outpatient Medications on File Prior to Encounter   Medication Sig    bisoprolol (ZEBETA) 5 MG tablet Take 5 mg by mouth once daily.    losartan (COZAAR) 50 MG tablet TK 1 T PO BID    paroxetine (PAXIL) 10 MG tablet Take 10 mg by mouth every morning.    aspirin 81 MG Chew Take 81 mg by mouth once daily.    cetirizine (ZYRTEC) 10 MG tablet Take 10 mg by mouth once daily.    metoprolol tartrate (LOPRESSOR) 25 MG tablet Take 25 mg by mouth 2 (two) times daily.    multivitamin (THERAGRAN) per tablet Take 1 tablet by mouth once daily.    norgestimate-ethinyl estradiol (ORTHO TRI-CYCLEN,TRI-SPRINTEC) 0.18/0.215/0.25 mg-35 mcg (28) tablet Take 1 tablet by mouth once daily.    pantoprazole (PROTONIX) 40 MG tablet Please take 1 tablet by mouth every 12 hours when you have epigastric discomfort, then 1 tablet every day.    valacyclovir (VALTREX) 1000 MG tablet Take 2 tablets (2,000 mg total) by mouth 2 (two) times daily.     Family History       Problem Relation (Age of Onset)    Diabetes Other    Hypertension Other          Tobacco Use    Smoking status: Never    Smokeless tobacco: Never   Substance and Sexual Activity    Alcohol use: Yes     Comment: Occassionally    Drug use: No    Sexual activity: Yes     Partners: Male     Birth control/protection: None     Review of Systems   Constitutional:  Positive for chills. Negative for fatigue and fever.   HENT:  Negative for congestion and rhinorrhea.    Eyes:  Negative for photophobia and visual disturbance.   Respiratory:  Positive for shortness of breath. Negative for cough.     Cardiovascular:  Positive for chest pain (Burning). Negative for palpitations and leg swelling.   Gastrointestinal:  Negative for abdominal pain, diarrhea, nausea and vomiting.   Genitourinary:  Negative for dysuria, frequency and urgency.   Skin:  Negative for pallor, rash and wound.   Neurological:  Positive for headaches. Negative for light-headedness.   Psychiatric/Behavioral:  Negative for confusion and decreased concentration.      Objective:     Vital Signs (Most Recent):  Temp: 97.5 °F (36.4 °C) (08/15/23 0108)  Pulse: 66 (08/15/23 0232)  Resp: 18 (08/15/23 0232)  BP: 139/74 (08/15/23 0232)  SpO2: 99 % (08/15/23 0232) Vital Signs (24h Range):  Temp:  [97.5 °F (36.4 °C)-97.7 °F (36.5 °C)] 97.5 °F (36.4 °C)  Pulse:  [52-74] 66  Resp:  [16-26] 18  SpO2:  [97 %-100 %] 99 %  BP: (137-249)/() 139/74     Weight: 74.8 kg (165 lb)  Body mass index is 27.46 kg/m².     Physical Exam  Vitals and nursing note reviewed.   Constitutional:       General: She is not in acute distress.     Appearance: She is well-developed.   HENT:      Head: Normocephalic and atraumatic.      Right Ear: External ear normal.      Left Ear: External ear normal.      Nose: Nose normal.   Eyes:      Conjunctiva/sclera: Conjunctivae normal.      Pupils: Pupils are equal, round, and reactive to light.   Cardiovascular:      Rate and Rhythm: Normal rate and regular rhythm.      Heart sounds: Murmur heard.   Pulmonary:      Effort: Pulmonary effort is normal. No respiratory distress.      Breath sounds: Normal breath sounds. No wheezing.   Abdominal:      General: Bowel sounds are normal. There is no distension.      Palpations: Abdomen is soft.      Tenderness: There is no abdominal tenderness.      Comments: No palpable hepatomegaly or splenomegaly    Musculoskeletal:         General: No tenderness. Normal range of motion.      Cervical back: Normal range of motion and neck supple.   Skin:     General: Skin is warm and dry.    Neurological:      Mental Status: She is alert and oriented to person, place, and time.      Cranial Nerves: No cranial nerve deficit.      Motor: No weakness.      Coordination: Coordination normal. Finger-Nose-Finger Test and Heel to Shin Test normal.   Psychiatric:         Thought Content: Thought content normal.              CRANIAL NERVES     CN III, IV, VI   Pupils are equal, round, and reactive to light.       Significant Labs: All pertinent labs within the past 24 hours have been reviewed.  Recent Lab Results  (Last 5 results in the past 24 hours)        08/15/23  0104   08/14/23  2323   08/14/23  2231   08/14/23  2230   08/14/23  2222        Benzodiazepines         Negative       Methadone metabolites         Negative       Phencyclidine         Negative       Albumin   4.3             Alkaline Phosphatase   173             ALT   21             Amphetamine Screen, Ur         Negative       Anion Gap   11             Appearance, UA         Clear       AST   37             Bacteria, UA         None       Barbiturate Screen, Ur         Negative       Baso #     0.02           Basophil %     0.3           Bilirubin (UA)         Negative       BILIRUBIN TOTAL   0.3  Comment: For infants and newborns, interpretation of results should be based  on gestational age, weight and in agreement with clinical  observations.    Premature Infant recommended reference ranges:  Up to 24 hours.............<8.0 mg/dL  Up to 48 hours............<12.0 mg/dL  3-5 days..................<15.0 mg/dL  6-29 days.................<15.0 mg/dL               BNP     48  Comment: Values of less than 100 pg/ml are consistent with non-CHF populations.           BUN   15             Calcium   9.3             Chloride   106             CO2   24             Cocaine (Metab.)         Negative       Color, UA         Colorless       Creatinine   1.1             Creatinine, Urine         13.5       Differential Method     Automated           eGFR    >60             Eos #     0.1           Eosinophil %     1.8           Glucose   116             Glucose, UA         Negative       Gran # (ANC)     3.1           Gran %     42.4           Hematocrit     41.2           Hemoglobin     13.2           Hyaline Casts, UA         0       Immature Grans (Abs)     0.01  Comment: Mild elevation in immature granulocytes is non specific and   can be seen in a variety of conditions including stress response,   acute inflammation, trauma and pregnancy. Correlation with other   laboratory and clinical findings is essential.             Immature Granulocytes     0.1           Ketones, UA         Negative       Leukocytes, UA         Negative       Lymph #     3.4           Lymph %     46.1           MCH     27.9           MCHC     32.0           MCV     87           Microscopic Comment         SEE COMMENT  Comment: Other formed elements not mentioned in the report are not   present in the microscopic examination.          Mono #     0.7           Mono %     9.3           MPV     11.7           NITRITE UA         Negative       nRBC     0           Occult Blood UA         Negative       Opiate Scrn, Ur         Negative       pH, UA         7.0       Platelets     199           Potassium   4.1  Comment: Specimen slightly hemolyzed             Preg Test, Ur       Negative         PROTEIN TOTAL   8.3             Protein, UA         1+  Comment: Recommend a 24 hour urine protein or a urine   protein/creatinine ratio if globulin induced proteinuria is  clinically suspected.          Acceptable       Yes         RBC     4.73           RBC, UA         0       RDW     12.9           Sodium   141             Specific [place], UA         1.010       Specimen UA         Urine, Clean Catch       Marijuana (THC) Metabolite         Negative       Toxicology Information         SEE COMMENT  Comment: This screen includes the following classes of drugs at the listed    cut-off:    Benzodiazepines 200 ng/ml  Methadone 300 ng/ml  Cocaine metabolite 300 ng/ml  Opiates 300 ng/ml  Barbiturates 200 ng/ml  Amphetamines 1000 ng/ml  Marijuana metabs (THC) 50 ng/ml  Phencyclidine (PCP) 25 ng/ml    This is a screening test. If results do not correlate with clinical   presentation, then a confirmatory send out test is advised.     This report is intended for use in clinical monitoring and management   of   patients. It is not intended for use in employment related drug   testing.         Troponin I 0.100  Comment: The reference interval for Troponin I represents the 99th percentile   cutoff   for our facility and is consistent with 3rd generation assay   performance.       <0.006  Comment: The reference interval for Troponin I represents the 99th percentile   cutoff   for our facility and is consistent with 3rd generation assay   performance.             UROBILINOGEN UA         Negative       WBC, UA         0       WBC     7.31                                  Significant Imaging: I have reviewed all pertinent imaging results/findings within the past 24 hours.  Imaging Results              CT Head Without Contrast (Final result)  Result time 08/14/23 22:53:16      Final result by Rocio Hawkins MD (08/14/23 22:53:16)                   Impression:      No acute intracranial abnormalities identified.      Electronically signed by: Rocio Hawkins MD  Date:    08/14/2023  Time:    22:53               Narrative:    EXAMINATION:  CT HEAD WITHOUT CONTRAST    CLINICAL HISTORY:  Headache, chronic, new features or increased frequency;    TECHNIQUE:  Low dose axial images were obtained through the head.  Coronal and sagittal reformations were also performed. Contrast was not administered.    COMPARISON:  CT head 02/20/2023.    FINDINGS:  No evidence of acute/recent major vascular distribution cerebral infarction, intraparenchymal hemorrhage, or intra-axial space occupying lesion. The ventricular  system is normal in size and configuration with no evidence of hydrocephalus. No effacement of the skull-base cisterns. No abnormal extra-axial fluid collections or blood products. Visualized paranasal sinuses and mastoid air cells are clear. The calvarium shows no significant abnormality.                                       X-Ray Chest AP Portable (Final result)  Result time 08/14/23 22:42:22      Final result by Rocio Hawkins MD (08/14/23 22:42:22)                   Impression:      No acute cardiopulmonary process identified.      Electronically signed by: Rocio Hawkins MD  Date:    08/14/2023  Time:    22:42               Narrative:    EXAMINATION:  XR CHEST AP PORTABLE    CLINICAL HISTORY:  shortness of breath;    TECHNIQUE:  Single frontal view of the chest was performed.    COMPARISON:  None    FINDINGS:  Cardiac silhouette is normal in size.  Lungs are symmetrically expanded.  No evidence of focal consolidative process, pneumothorax, or significant pleural effusion.  No acute osseous abnormality identified.                                       Assessment/Plan:     Generalized anxiety disorder  Chronic, stable, continue home regimen    Essential hypertension  BP initially uncontrolled in ED, currently 139/74, continue home regimen  Prn hydralazine SBP <180    Elevated troponin  Presented to the hospital due to headache that patient believed 2/2 hypertension.  Highest BP in /110, blood pressure was treated and patient was prepped for discharge however BP suddenly elevated and patient had an associated symptom of heat from my chest patient staunchly denies any associated pain  Consult to cardiology  -cardiac monitoring  -serial troponins  -ASA and PRN NTG  -2D echo  -TSH and lipid panel      VTE Risk Mitigation (From admission, onward)         Ordered     enoxaparin injection 40 mg  Daily         08/15/23 0201     IP VTE HIGH RISK PATIENT  Once         08/15/23 0201     Place sequential  compression device  Until discontinued         08/15/23 0201                     On 08/15/2023, patient should be placed in hospital observation services under my care in collaboration with Jasson Luna MD.      Daniel Bonilla NP  Department of Hospital Medicine  [facility] - Emergency Dept

## 2023-08-15 NOTE — SUBJECTIVE & OBJECTIVE
Past Medical History:   Diagnosis Date    Herpes simplex without mention of complication     Hypertension     MVP (mitral valve prolapse)        Past Surgical History:   Procedure Laterality Date    DILATION AND CURETTAGE OF UTERUS      VAGINAL DELIVERY      x4 normal       Review of patient's allergies indicates:  No Known Allergies    No current facility-administered medications on file prior to encounter.     Current Outpatient Medications on File Prior to Encounter   Medication Sig    bisoprolol (ZEBETA) 5 MG tablet Take 5 mg by mouth once daily.    losartan (COZAAR) 50 MG tablet TK 1 T PO BID    paroxetine (PAXIL) 10 MG tablet Take 10 mg by mouth every morning.    aspirin 81 MG Chew Take 81 mg by mouth once daily.    cetirizine (ZYRTEC) 10 MG tablet Take 10 mg by mouth once daily.    metoprolol tartrate (LOPRESSOR) 25 MG tablet Take 25 mg by mouth 2 (two) times daily.    multivitamin (THERAGRAN) per tablet Take 1 tablet by mouth once daily.    norgestimate-ethinyl estradiol (ORTHO TRI-CYCLEN,TRI-SPRINTEC) 0.18/0.215/0.25 mg-35 mcg (28) tablet Take 1 tablet by mouth once daily.    pantoprazole (PROTONIX) 40 MG tablet Please take 1 tablet by mouth every 12 hours when you have epigastric discomfort, then 1 tablet every day.    valacyclovir (VALTREX) 1000 MG tablet Take 2 tablets (2,000 mg total) by mouth 2 (two) times daily.     Family History       Problem Relation (Age of Onset)    Diabetes Other    Hypertension Other          Tobacco Use    Smoking status: Never    Smokeless tobacco: Never   Substance and Sexual Activity    Alcohol use: Yes     Comment: Occassionally    Drug use: No    Sexual activity: Yes     Partners: Male     Birth control/protection: None     Review of Systems   Constitutional: Negative for chills, diaphoresis, fever and malaise/fatigue.   HENT:  Negative for nosebleeds.    Eyes:  Negative for blurred vision and double vision.   Cardiovascular:  Positive for chest pain. Negative for  claudication, cyanosis, dyspnea on exertion, leg swelling, orthopnea, palpitations, paroxysmal nocturnal dyspnea and syncope.   Respiratory:  Negative for cough, shortness of breath and wheezing.    Skin:  Negative for dry skin and poor wound healing.   Musculoskeletal:  Negative for back pain, joint swelling and myalgias.   Gastrointestinal:  Negative for abdominal pain, nausea and vomiting.   Genitourinary:  Negative for hematuria.   Neurological:  Positive for headaches. Negative for dizziness, numbness, seizures and weakness.   Psychiatric/Behavioral:  Negative for altered mental status and depression.      Objective:     Vital Signs (Most Recent):  Temp: 97.5 °F (36.4 °C) (08/15/23 0108)  Pulse: (!) 53 (08/15/23 0532)  Resp: 17 (08/15/23 0532)  BP: 135/67 (08/15/23 0532)  SpO2: 100 % (08/15/23 0532) Vital Signs (24h Range):  Temp:  [97.5 °F (36.4 °C)-97.7 °F (36.5 °C)] 97.5 °F (36.4 °C)  Pulse:  [52-74] 53  Resp:  [16-26] 17  SpO2:  [97 %-100 %] 100 %  BP: (118-249)/() 135/67     Weight: 74.8 kg (165 lb)  Body mass index is 27.46 kg/m².    SpO2: 100 %       No intake or output data in the 24 hours ending 08/15/23 0608    Lines/Drains/Airways       Peripheral Intravenous Line  Duration                  Peripheral IV - Single Lumen 08/14/23 2155 20 G Left Antecubital <1 day                     Physical Exam  Constitutional:       General: She is not in acute distress.     Appearance: Normal appearance. She is well-developed and normal weight. She is not ill-appearing, toxic-appearing or diaphoretic.   HENT:      Head: Normocephalic and atraumatic.   Eyes:      General: No scleral icterus.     Extraocular Movements: Extraocular movements intact.      Conjunctiva/sclera: Conjunctivae normal.      Pupils: Pupils are equal, round, and reactive to light.   Neck:      Vascular: No JVD.      Trachea: No tracheal deviation.   Cardiovascular:      Rate and Rhythm: Normal rate and regular rhythm.      Pulses:            Radial pulses are 2+ on the right side.      Heart sounds: S1 normal and S2 normal. No murmur heard.     No friction rub. No gallop.   Pulmonary:      Effort: Pulmonary effort is normal. No respiratory distress.      Breath sounds: Normal breath sounds. No stridor. No wheezing, rhonchi or rales.   Chest:      Chest wall: No tenderness.   Abdominal:      General: There is no distension.      Palpations: Abdomen is soft.   Musculoskeletal:         General: No swelling or tenderness. Normal range of motion.      Cervical back: Normal range of motion and neck supple. No rigidity.      Right lower leg: No edema.      Left lower leg: No edema.   Skin:     General: Skin is warm and dry.      Coloration: Skin is not jaundiced.   Neurological:      General: No focal deficit present.      Mental Status: She is alert and oriented to person, place, and time.      Cranial Nerves: No cranial nerve deficit.   Psychiatric:         Mood and Affect: Mood normal.         Behavior: Behavior normal.          Current Medications:   aspirin  81 mg Oral Daily    enoxparin  40 mg Subcutaneous Daily    losartan  50 mg Oral BID    metoprolol succinate  100 mg Oral Daily    paroxetine  10 mg Oral Daily    polyethylene glycol  17 g Oral Daily       acetaminophen, aluminum-magnesium hydroxide-simethicone, hydrALAZINE, melatonin, naloxone, nitroGLYCERIN, ondansetron, prochlorperazine    Laboratory (all labs reviewed):  CBC:  Recent Labs   Lab 10/21/22  0721 02/20/23  0636 08/14/23 2231   WBC 5.69 6.36 7.31   Hemoglobin 11.9 L 12.6 13.2   Hematocrit 37.3 39.8 41.2   Platelets 203 238 199       CHEMISTRIES:  Recent Labs   Lab 10/21/22  0721 02/20/23  0636 08/14/23  2323 08/15/23  0524   Glucose 125 H 103 116 H 122 H   Sodium 145 140 141 141   Potassium 4.1 3.6 4.1 4.0   BUN 16 11 15 14   Creatinine 0.9 0.9 1.1 1.0   eGFR >60 >60 >60 >60   Calcium 9.3 9.3 9.3 9.4       CARDIAC BIOMARKERS:  Recent Labs   Lab 08/14/23  2231 08/15/23  0104  08/15/23  0524   Troponin I <0.006 0.100 H 0.143 H       COAGS:        LIPIDS/LFTS:  Recent Labs   Lab 10/21/22  0721 02/20/23  0636 08/14/23  2323 08/15/23  0524   Cholesterol  --   --   --  188   Triglycerides  --   --   --  46   HDL  --   --   --  57   LDL Cholesterol  --   --   --  121.8   Non-HDL Cholesterol  --   --   --  131   AST 33 25 37 26   ALT 18 13 21 16       BNP:  Recent Labs   Lab 08/14/23  2231   BNP 48       TSH:        Free T4:        The 10-year ASCVD risk score (Derek NORRIS, et al., 2019) is: 3.6%    Values used to calculate the score:      Age: 47 years      Sex: Female      Is Non- : Yes      Diabetic: No      Tobacco smoker: No      Systolic Blood Pressure: 135 mmHg      Is BP treated: Yes      HDL Cholesterol: 57 mg/dL      Total Cholesterol: 188 mg/dL      Diagnostic Results:  ECG (personally reviewed and interpreted tracing(s)):  8/15/23 0051 SR 58, NSSTTW changes, similar to 8/14/23    Chest X-Ray (personally reviewed and interpreted image(s)): 8/14/23 NAD    Echo: 2/26/21 (care everywhere, repeat ordered)    SB 55 bpm     Borderline RA size     Normal RV     Mild TR / PAsys ~ 26 mmHg     LA appears borderline size / LAD measures 29 mm     normal MV anatomy     minimal billowing AML / criteria for MVP not met     minimal MR posteriorly directed     Normal LV dimension       Overall preserved LV systolic function       EF > 55% / normal diastolic function     Normal aortic valve     No AS and no AR     No pericardial effusion

## 2023-08-15 NOTE — HOSPITAL COURSE
Ms Racquel Orozco was placed in observation with hypertensive emergency, headache, and elevated troponin. Recently discontinued amlodipine due to lower extremity edema, continued bisoprolol, and increased losartan to 50mg BID. BP controlled without gtt. Cardiology consulted. LHC normal. Headache resolved. Added HCTZ 12.5mg daily. BP improved. Stable for discharge to home with HCTZ and continue other BP Rx. Follow up with Cardiologist.

## 2023-09-18 ENCOUNTER — OFFICE VISIT (OUTPATIENT)
Dept: CARDIOLOGY | Facility: CLINIC | Age: 48
End: 2023-09-18
Payer: COMMERCIAL

## 2023-09-18 VITALS
HEART RATE: 54 BPM | HEIGHT: 65 IN | SYSTOLIC BLOOD PRESSURE: 138 MMHG | RESPIRATION RATE: 18 BRPM | DIASTOLIC BLOOD PRESSURE: 98 MMHG | WEIGHT: 161.69 LBS | BODY MASS INDEX: 26.94 KG/M2 | OXYGEN SATURATION: 97 %

## 2023-09-18 DIAGNOSIS — I10 ESSENTIAL HYPERTENSION: Primary | ICD-10-CM

## 2023-09-18 DIAGNOSIS — R00.2 HEART PALPITATIONS: ICD-10-CM

## 2023-09-18 PROCEDURE — 1160F RVW MEDS BY RX/DR IN RCRD: CPT | Mod: CPTII,S$GLB,, | Performed by: INTERNAL MEDICINE

## 2023-09-18 PROCEDURE — 99999 PR PBB SHADOW E&M-EST. PATIENT-LVL IV: CPT | Mod: PBBFAC,,, | Performed by: INTERNAL MEDICINE

## 2023-09-18 PROCEDURE — 1159F MED LIST DOCD IN RCRD: CPT | Mod: CPTII,S$GLB,, | Performed by: INTERNAL MEDICINE

## 2023-09-18 PROCEDURE — 99999 PR PBB SHADOW E&M-EST. PATIENT-LVL IV: ICD-10-PCS | Mod: PBBFAC,,, | Performed by: INTERNAL MEDICINE

## 2023-09-18 PROCEDURE — 4010F PR ACE/ARB THEARPY RXD/TAKEN: ICD-10-PCS | Mod: CPTII,S$GLB,, | Performed by: INTERNAL MEDICINE

## 2023-09-18 PROCEDURE — 3075F SYST BP GE 130 - 139MM HG: CPT | Mod: CPTII,S$GLB,, | Performed by: INTERNAL MEDICINE

## 2023-09-18 PROCEDURE — 3075F PR MOST RECENT SYSTOLIC BLOOD PRESS GE 130-139MM HG: ICD-10-PCS | Mod: CPTII,S$GLB,, | Performed by: INTERNAL MEDICINE

## 2023-09-18 PROCEDURE — 99213 PR OFFICE/OUTPT VISIT, EST, LEVL III, 20-29 MIN: ICD-10-PCS | Mod: S$GLB,,, | Performed by: INTERNAL MEDICINE

## 2023-09-18 PROCEDURE — 1159F PR MEDICATION LIST DOCUMENTED IN MEDICAL RECORD: ICD-10-PCS | Mod: CPTII,S$GLB,, | Performed by: INTERNAL MEDICINE

## 2023-09-18 PROCEDURE — 3008F PR BODY MASS INDEX (BMI) DOCUMENTED: ICD-10-PCS | Mod: CPTII,S$GLB,, | Performed by: INTERNAL MEDICINE

## 2023-09-18 PROCEDURE — 99213 OFFICE O/P EST LOW 20 MIN: CPT | Mod: S$GLB,,, | Performed by: INTERNAL MEDICINE

## 2023-09-18 PROCEDURE — 3080F PR MOST RECENT DIASTOLIC BLOOD PRESSURE >= 90 MM HG: ICD-10-PCS | Mod: CPTII,S$GLB,, | Performed by: INTERNAL MEDICINE

## 2023-09-18 PROCEDURE — 3008F BODY MASS INDEX DOCD: CPT | Mod: CPTII,S$GLB,, | Performed by: INTERNAL MEDICINE

## 2023-09-18 PROCEDURE — 4010F ACE/ARB THERAPY RXD/TAKEN: CPT | Mod: CPTII,S$GLB,, | Performed by: INTERNAL MEDICINE

## 2023-09-18 PROCEDURE — 3080F DIAST BP >= 90 MM HG: CPT | Mod: CPTII,S$GLB,, | Performed by: INTERNAL MEDICINE

## 2023-09-18 PROCEDURE — 1160F PR REVIEW ALL MEDS BY PRESCRIBER/CLIN PHARMACIST DOCUMENTED: ICD-10-PCS | Mod: CPTII,S$GLB,, | Performed by: INTERNAL MEDICINE

## 2023-09-18 RX ORDER — HYDRALAZINE HYDROCHLORIDE 25 MG/1
25 TABLET, FILM COATED ORAL 2 TIMES DAILY
COMMUNITY
Start: 2023-08-24 | End: 2023-09-18

## 2023-09-18 RX ORDER — HYDROCHLOROTHIAZIDE 25 MG/1
25 TABLET ORAL DAILY
Qty: 90 TABLET | Refills: 3 | Status: SHIPPED | OUTPATIENT
Start: 2023-09-18 | End: 2024-09-17

## 2023-09-18 RX ORDER — LOSARTAN POTASSIUM 100 MG/1
100 TABLET ORAL DAILY
Qty: 90 TABLET | Refills: 3 | Status: SHIPPED | OUTPATIENT
Start: 2023-09-18

## 2023-09-18 RX ORDER — RIMEGEPANT SULFATE 75 MG/75MG
75 TABLET, ORALLY DISINTEGRATING ORAL DAILY PRN
COMMUNITY
Start: 2023-09-12 | End: 2024-03-04

## 2023-09-18 NOTE — LETTER
September 18, 2023        Yony Watt MD  [address] [facility]  [place] [address]             [facility] - Cardiology  [address]  [place] [address]  Phone: [phone]   Patient: Racquel Orozco   MR Number: [id number]   YOB: 1975   Date of Visit: 9/18/2023       Dear Dr. Watt:    Thank you for referring Racquel Orozco to me for evaluation. Attached you will find relevant portions of my assessment and plan of care.    If you have questions, please do not hesitate to call me. I look forward to following Racquel Orozco along with you.    Sincerely,      Zane Jacob MD            CC  No Recipients    Enclosure

## 2023-09-18 NOTE — PROGRESS NOTES
CARDIOVASCULAR PROGRESS NOTE    REASON FOR CONSULT:   Racquel Orozco is a 47 y.o. female who presents for follow up of HTN.    PCP: Alysa  HISTORY OF PRESENT ILLNESS:   The patient returns for follow-up.  She denies intercurrent angina, dyspnea, syncope.  She does have continued palpitations of a daily nature.  She states that these are improved with nebivolol and previously were unresponsive to metoprolol.  She otherwise denies PND, orthopnea, melena, diarrhea, or claudication symptoms.  Her blood pressure remains uncontrolled today.  She tells me she see intolerant of amlodipine because it caused lower extremity edema.    CARDIOVASCULAR HISTORY:   none    PAST MEDICAL HISTORY:     Past Medical History:   Diagnosis Date    Herpes simplex without mention of complication     Hypertension     MVP (mitral valve prolapse)        PAST SURGICAL HISTORY:     Past Surgical History:   Procedure Laterality Date    ANGIOGRAM, RENAL ARTERIES, BILATERAL Bilateral 8/15/2023    Procedure: Angiogram, Renal Arteries, Bilateral;  Surgeon: Zane Jacob MD;  Location: [facility] CATH LAB;  Service: Cardiology;  Laterality: Bilateral;    DILATION AND CURETTAGE OF UTERUS      LEFT HEART CATHETERIZATION Left 8/15/2023    Procedure: Left heart cath;  Surgeon: Zane Jacob MD;  Location: [facility] CATH LAB;  Service: Cardiology;  Laterality: Left;  R rad access, not before 9am    VAGINAL DELIVERY      x4 normal       ALLERGIES AND MEDICATION:   Review of patient's allergies indicates:  No Known Allergies     Medication List            Accurate as of September 18, 2023  1:44 PM. If you have any questions, ask your nurse or doctor.                CONTINUE taking these medications      bisoprolol 5 MG tablet  Commonly known as: ZEBETA     hydrALAZINE 25 MG tablet  Commonly known as: APRESOLINE     hydroCHLOROthiazide 12.5 MG Tab  Commonly known as: HYDRODIURIL  Take 1 tablet (12.5 mg total) by mouth once daily.     losartan 50 MG  "tablet  Commonly known as: COZAAR     multivitamin per tablet  Commonly known as: THERAGRAN     NURTEC 75 mg odt  Generic drug: rimegepant     paroxetine 10 MG tablet  Commonly known as: PAXIL              SOCIAL HISTORY:     Social History     Socioeconomic History    Marital status:    Tobacco Use    Smoking status: Never    Smokeless tobacco: Never   Substance and Sexual Activity    Alcohol use: Yes     Comment: Occassionally    Drug use: No    Sexual activity: Yes     Partners: Male     Birth control/protection: None       FAMILY HISTORY:     Family History   Problem Relation Age of Onset    Diabetes Other     Hypertension Other     Cancer Neg Hx        REVIEW OF SYSTEMS:   Review of Systems   Constitutional:  Negative for chills, diaphoresis and fever.   HENT:  Negative for nosebleeds.    Eyes:  Negative for blurred vision, double vision and photophobia.   Respiratory:  Negative for hemoptysis, shortness of breath and wheezing.    Cardiovascular:  Positive for palpitations. Negative for chest pain, orthopnea, claudication, leg swelling and PND.   Gastrointestinal:  Negative for abdominal pain, blood in stool, heartburn, melena, nausea and vomiting.   Genitourinary:  Negative for flank pain and hematuria.   Musculoskeletal:  Negative for falls, myalgias and neck pain.   Skin:  Negative for rash.   Neurological:  Negative for dizziness, seizures, loss of consciousness, weakness and headaches.   Endo/Heme/Allergies:  Negative for polydipsia. Does not bruise/bleed easily.   Psychiatric/Behavioral:  Negative for depression and memory loss. The patient is not nervous/anxious.        PHYSICAL EXAM:     Vitals:    09/18/23 1338   BP: (!) 138/98   Pulse: (!) 54   Resp: 18    Body mass index is 26.91 kg/m².  Weight: 73.4 kg (161 lb 11.3 oz)   Height: 5' 5" (165.1 cm)     Physical Exam  Vitals reviewed.   Constitutional:       General: She is not in acute distress.     Appearance: Normal appearance. She is " well-developed. She is not ill-appearing, toxic-appearing or diaphoretic.   HENT:      Head: Normocephalic and atraumatic.   Eyes:      General: No scleral icterus.     Extraocular Movements: Extraocular movements intact.      Conjunctiva/sclera: Conjunctivae normal.      Pupils: Pupils are equal, round, and reactive to light.   Neck:      Thyroid: No thyromegaly.      Vascular: Normal carotid pulses. No carotid bruit or JVD.      Trachea: Trachea normal.   Cardiovascular:      Rate and Rhythm: Normal rate and regular rhythm.      Pulses:           Carotid pulses are 2+ on the right side and 2+ on the left side.     Heart sounds: S1 normal and S2 normal. No murmur heard.     No friction rub. No gallop.      Comments: R rad access site c/d/i  Pulmonary:      Effort: Pulmonary effort is normal. No respiratory distress.      Breath sounds: Normal breath sounds. No stridor. No wheezing, rhonchi or rales.   Chest:      Chest wall: No tenderness.   Abdominal:      General: There is no distension.      Palpations: Abdomen is soft.   Musculoskeletal:         General: No swelling or tenderness. Normal range of motion.      Cervical back: Normal range of motion and neck supple. No edema or rigidity.      Right lower leg: No edema.      Left lower leg: No edema.   Feet:      Right foot:      Skin integrity: No ulcer.      Left foot:      Skin integrity: No ulcer.   Skin:     General: Skin is warm and dry.      Coloration: Skin is not jaundiced.   Neurological:      General: No focal deficit present.      Mental Status: She is alert and oriented to person, place, and time.      Cranial Nerves: No cranial nerve deficit.   Psychiatric:         Mood and Affect: Mood normal.         Speech: Speech normal.         Behavior: Behavior normal. Behavior is cooperative.         DATA:   EKG: (personally reviewed tracing)  8/15/23 SR 58, NSSTTW changes, similar to 8/14/23    Laboratory:  CBC:  Recent Labs   Lab 10/21/22  0748  02/20/23  0636 08/14/23  2231   WBC 5.69 6.36 7.31   Hemoglobin 11.9 L 12.6 13.2   Hematocrit 37.3 39.8 41.2   Platelets 203 238 199       CHEMISTRIES:  Recent Labs   Lab 10/21/22  0721 02/20/23  0636 08/14/23  2323 08/15/23  0524   Glucose 125 H 103 116 H 122 H   Sodium 145 140 141 141   Potassium 4.1 3.6 4.1 4.0   BUN 16 11 15 14   Creatinine 0.9 0.9 1.1 1.0   eGFR >60 >60 >60 >60   Calcium 9.3 9.3 9.3 9.4       CARDIAC BIOMARKERS:  Recent Labs   Lab 08/15/23  0104 08/15/23  0524 08/15/23  0811   Troponin I 0.100 H 0.143 H 0.071 H       COAGS:        LIPIDS/LFTS:  Recent Labs   Lab 02/20/23  0636 08/14/23  2323 08/15/23  0524   Cholesterol  --   --  188   Triglycerides  --   --  46   HDL  --   --  57   LDL Cholesterol  --   --  121.8   Non-HDL Cholesterol  --   --  131   AST 25 37 26   ALT 13 21 16     Lab Results   Component Value Date    TSH 1.569 08/15/2023     The 10-year ASCVD risk score (Derek NORRIS, et al., 2019) is: 3.9%    Values used to calculate the score:      Age: 47 years      Sex: Female      Is Non- : Yes      Diabetic: No      Tobacco smoker: No      Systolic Blood Pressure: 138 mmHg      Is BP treated: Yes      HDL Cholesterol: 57 mg/dL      Total Cholesterol: 188 mg/dL      Cardiovascular Testing:  Cath/renal angio 8/15/23  LVEDP: 12mmHg  LVEF: 65-70% by echo  Dominance: Right  LM: normal  LAD: normal  LCx: normal  RCA: normal  R renal: normal  L renal: normal  Hemostasis:  R Radial band  Impression:  CP/HTN emerg with elev trop  Normal cors/LV fxn  Normal renal arteries bilaterally  R rad vasband for hemostasis  Plan:  Cont med rx  Stop ASA/Plavix  Titrate antihtn meds  Home today  Follow up with Dr. Jacob  Consider as a candidate for renal denervation in the future.    Echo 8/15/23    Left Ventricle: The left ventricle is normal in size. Normal wall thickness. There is concentric remodeling. Normal wall motion. There is normal systolic function with a visually estimated  ejection fraction of 65 - 70%. There is normal diastolic function.    Right Ventricle: Normal right ventricular cavity size. Wall thickness is normal. Right ventricle wall motion  is normal. Systolic function is normal.    Tricuspid Valve: There is mild regurgitation.    Pulmonary Artery: The estimated pulmonary artery systolic pressure is 34 mmHg.    Pericardium: Trivial pericardial effusion present. No indication of cardiac tamponade.    ASSESSMENT:   # HTN, uncontrolled.  Prev intol of amlod (edema).  # Palps, persistent, improved on bisoprolol (metoprolol ineffective)    PLAN:   Cont med rx  Stop hydrala  Inc HCTZ 25mg qd  Check BMP 2 weeks  Holter, EVM as contingency  RTC 1 month (Oct 2023).  Next drug: aldactone.  Consider as a candidate for renal denervation when available.      Zane Jacob MD, FACC

## 2023-10-17 ENCOUNTER — LAB VISIT (OUTPATIENT)
Dept: LAB | Facility: HOSPITAL | Age: 48
End: 2023-10-17
Attending: INTERNAL MEDICINE
Payer: COMMERCIAL

## 2023-10-17 ENCOUNTER — TELEPHONE (OUTPATIENT)
Dept: CARDIOLOGY | Facility: CLINIC | Age: 48
End: 2023-10-17
Payer: COMMERCIAL

## 2023-10-17 DIAGNOSIS — I10 ESSENTIAL HYPERTENSION: ICD-10-CM

## 2023-10-17 LAB
ANION GAP SERPL CALC-SCNC: 9 MMOL/L (ref 8–16)
BUN SERPL-MCNC: 19 MG/DL (ref 6–20)
CALCIUM SERPL-MCNC: 10.1 MG/DL (ref 8.7–10.5)
CHLORIDE SERPL-SCNC: 105 MMOL/L (ref 95–110)
CO2 SERPL-SCNC: 31 MMOL/L (ref 23–29)
CREAT SERPL-MCNC: 1.3 MG/DL (ref 0.5–1.4)
EST. GFR  (NO RACE VARIABLE): 50 ML/MIN/1.73 M^2
GLUCOSE SERPL-MCNC: 137 MG/DL (ref 70–110)
POTASSIUM SERPL-SCNC: 3.4 MMOL/L (ref 3.5–5.1)
SODIUM SERPL-SCNC: 145 MMOL/L (ref 136–145)

## 2023-10-17 PROCEDURE — 80048 BASIC METABOLIC PNL TOTAL CA: CPT | Performed by: INTERNAL MEDICINE

## 2023-10-17 PROCEDURE — 36415 COLL VENOUS BLD VENIPUNCTURE: CPT | Performed by: INTERNAL MEDICINE

## 2023-10-17 NOTE — TELEPHONE ENCOUNTER
Called this patient in reference of getting labs done first as well as her Holter monitor before appointment. Informed patient that she is on a wait list in case anybody cancels ahead of time.

## 2023-10-18 ENCOUNTER — HOSPITAL ENCOUNTER (OUTPATIENT)
Dept: CARDIOLOGY | Facility: HOSPITAL | Age: 48
Discharge: HOME | End: 2023-10-18
Attending: INTERNAL MEDICINE
Payer: COMMERCIAL

## 2023-10-18 DIAGNOSIS — R00.2 HEART PALPITATIONS: ICD-10-CM

## 2023-10-18 PROCEDURE — 93225 XTRNL ECG REC<48 HRS REC: CPT

## 2023-10-18 PROCEDURE — 93227 HOLTER MONITOR - 24 HOUR (CUPID ONLY): ICD-10-PCS | Mod: ,,, | Performed by: INTERNAL MEDICINE

## 2023-10-18 PROCEDURE — 93227 XTRNL ECG REC<48 HR R&I: CPT | Mod: ,,, | Performed by: INTERNAL MEDICINE

## 2023-10-26 LAB
OHS CV EVENT MONITOR DAY: 1
OHS CV HOLTER LENGTH DECIMAL HOURS: 48
OHS CV HOLTER LENGTH HOURS: 24
OHS CV HOLTER LENGTH MINUTES: 0
OHS CV HOLTER SINUS AVERAGE HR: 63
OHS CV HOLTER SINUS MAX HR: 124
OHS CV HOLTER SINUS MIN HR: 47

## 2023-10-30 NOTE — ASSESSMENT & PLAN NOTE
?driving some of the above  
BP initially uncontrolled in ED, currently 139/74, continue home regimen  Prn hydralazine SBP <180  
Chronic, stable, continue home regimen  
Presented to the hospital due to headache that patient believed 2/2 hypertension.  Highest BP in /110, blood pressure was treated and patient was prepped for discharge however BP suddenly elevated and patient had an associated symptom of heat from my chest patient staunchly denies any associated pain  Consult to cardiology  -cardiac monitoring  -serial troponins  -ASA and PRN NTG  -2D echo  -TSH and lipid panel  
Renal angio planned  Add HCTZ  Cont losartan 50mg bid  Change toprol to coreg 3.125mg bid, titrate as able  Add HCTZ 25mg qd, aldact next  Avoid amlod (LE edema)    
Trop up to 0.14 in setting of severe HTN and possible ischemic sxs.  EKG nondiagnostic.  Neg prev stress test (3/29/19, see Ker[facility] note).  Will proceed with cor and renal angio.  Cont ASA  Load plavix  If ASCVD noted on angio, will start statin.    Risks, benefits and alternatives of the catheterization procedure were discussed with the patient which include but are not limited to: bleeding, infection, death, heart attack, arrhythmia, kidney failure, stroke, need for emergency surgery, etc.  Patient understands and and agrees to proceed.  Consent was placed on the chart.    
none

## 2023-10-31 ENCOUNTER — TELEPHONE (OUTPATIENT)
Dept: CARDIOLOGY | Facility: CLINIC | Age: 48
End: 2023-10-31

## 2023-10-31 ENCOUNTER — OFFICE VISIT (OUTPATIENT)
Dept: CARDIOLOGY | Facility: CLINIC | Age: 48
End: 2023-10-31
Payer: COMMERCIAL

## 2023-10-31 VITALS
WEIGHT: 156.63 LBS | RESPIRATION RATE: 17 BRPM | HEART RATE: 55 BPM | HEIGHT: 65 IN | DIASTOLIC BLOOD PRESSURE: 80 MMHG | BODY MASS INDEX: 26.1 KG/M2 | SYSTOLIC BLOOD PRESSURE: 158 MMHG | OXYGEN SATURATION: 100 %

## 2023-10-31 DIAGNOSIS — G47.33 OSA (OBSTRUCTIVE SLEEP APNEA): ICD-10-CM

## 2023-10-31 DIAGNOSIS — R00.2 HEART PALPITATIONS: ICD-10-CM

## 2023-10-31 DIAGNOSIS — I10 ESSENTIAL HYPERTENSION: Primary | ICD-10-CM

## 2023-10-31 PROCEDURE — 99214 PR OFFICE/OUTPT VISIT, EST, LEVL IV, 30-39 MIN: ICD-10-PCS | Mod: S$GLB,,, | Performed by: INTERNAL MEDICINE

## 2023-10-31 PROCEDURE — 99999 PR PBB SHADOW E&M-EST. PATIENT-LVL IV: ICD-10-PCS | Mod: PBBFAC,,, | Performed by: INTERNAL MEDICINE

## 2023-10-31 PROCEDURE — 3079F DIAST BP 80-89 MM HG: CPT | Mod: CPTII,S$GLB,, | Performed by: INTERNAL MEDICINE

## 2023-10-31 PROCEDURE — 4010F PR ACE/ARB THEARPY RXD/TAKEN: ICD-10-PCS | Mod: CPTII,S$GLB,, | Performed by: INTERNAL MEDICINE

## 2023-10-31 PROCEDURE — 3008F BODY MASS INDEX DOCD: CPT | Mod: CPTII,S$GLB,, | Performed by: INTERNAL MEDICINE

## 2023-10-31 PROCEDURE — 99999 PR PBB SHADOW E&M-EST. PATIENT-LVL IV: CPT | Mod: PBBFAC,,, | Performed by: INTERNAL MEDICINE

## 2023-10-31 PROCEDURE — 3077F SYST BP >= 140 MM HG: CPT | Mod: CPTII,S$GLB,, | Performed by: INTERNAL MEDICINE

## 2023-10-31 PROCEDURE — 3079F PR MOST RECENT DIASTOLIC BLOOD PRESSURE 80-89 MM HG: ICD-10-PCS | Mod: CPTII,S$GLB,, | Performed by: INTERNAL MEDICINE

## 2023-10-31 PROCEDURE — 1160F RVW MEDS BY RX/DR IN RCRD: CPT | Mod: CPTII,S$GLB,, | Performed by: INTERNAL MEDICINE

## 2023-10-31 PROCEDURE — 4010F ACE/ARB THERAPY RXD/TAKEN: CPT | Mod: CPTII,S$GLB,, | Performed by: INTERNAL MEDICINE

## 2023-10-31 PROCEDURE — 1159F MED LIST DOCD IN RCRD: CPT | Mod: CPTII,S$GLB,, | Performed by: INTERNAL MEDICINE

## 2023-10-31 PROCEDURE — 3077F PR MOST RECENT SYSTOLIC BLOOD PRESSURE >= 140 MM HG: ICD-10-PCS | Mod: CPTII,S$GLB,, | Performed by: INTERNAL MEDICINE

## 2023-10-31 PROCEDURE — 1160F PR REVIEW ALL MEDS BY PRESCRIBER/CLIN PHARMACIST DOCUMENTED: ICD-10-PCS | Mod: CPTII,S$GLB,, | Performed by: INTERNAL MEDICINE

## 2023-10-31 PROCEDURE — 3008F PR BODY MASS INDEX (BMI) DOCUMENTED: ICD-10-PCS | Mod: CPTII,S$GLB,, | Performed by: INTERNAL MEDICINE

## 2023-10-31 PROCEDURE — 1159F PR MEDICATION LIST DOCUMENTED IN MEDICAL RECORD: ICD-10-PCS | Mod: CPTII,S$GLB,, | Performed by: INTERNAL MEDICINE

## 2023-10-31 PROCEDURE — 99214 OFFICE O/P EST MOD 30 MIN: CPT | Mod: S$GLB,,, | Performed by: INTERNAL MEDICINE

## 2023-10-31 RX ORDER — SPIRONOLACTONE 25 MG/1
25 TABLET ORAL DAILY
Qty: 90 TABLET | Refills: 3 | Status: SHIPPED | OUTPATIENT
Start: 2023-10-31 | End: 2024-10-30

## 2023-10-31 NOTE — LETTER
October 31, 2023        Yony Watt MD  [address]  [place] [address]             [facility]  [address]  [address]  [facility] [place] [address]  Phone: [phone]   Patient: Racquel Orozco   MR Number: [id number]   YOB: 1975   Date of Visit: 10/31/2023       Dear Dr. Watt:    Thank you for referring Racquel Orozco to me for evaluation. Attached you will find relevant portions of my assessment and plan of care.    If you have questions, please do not hesitate to call me. I look forward to following Racquel Orozco along with you.    Sincerely,      Zane Jacob MD            CC  No Recipients    Enclosure

## 2023-10-31 NOTE — PROGRESS NOTES
CARDIOVASCULAR PROGRESS NOTE    REASON FOR CONSULT:   Racquel Orozco is a 47 y.o. female who presents for follow up of HTN.    PCP: Alysa  HISTORY OF PRESENT ILLNESS:   The patient returns for follow up.  She continues to have episodic palpitations.  Her Holter was negative.  She denies any angina, dyspnea, or syncope.  There has been no PND, orthopnea, melena, hematuria, or claudication symptoms.  She does report a history of sleep disturbance, and has been told that she snores.    I reviewed the results of her Holter which were negative.    CARDIOVASCULAR HISTORY:   none    PAST MEDICAL HISTORY:     Past Medical History:   Diagnosis Date    Herpes simplex without mention of complication     Hypertension     MVP (mitral valve prolapse)        PAST SURGICAL HISTORY:     Past Surgical History:   Procedure Laterality Date    ANGIOGRAM, RENAL ARTERIES, BILATERAL Bilateral 8/15/2023    Procedure: Angiogram, Renal Arteries, Bilateral;  Surgeon: Zane Jacob MD;  Location: [facility] CATH LAB;  Service: Cardiology;  Laterality: Bilateral;    DILATION AND CURETTAGE OF UTERUS      LEFT HEART CATHETERIZATION Left 8/15/2023    Procedure: Left heart cath;  Surgeon: Zane Jacob MD;  Location: [facility] CATH LAB;  Service: Cardiology;  Laterality: Left;  R rad access, not before 9am    VAGINAL DELIVERY      x4 normal       ALLERGIES AND MEDICATION:   Review of patient's allergies indicates:  No Known Allergies     Medication List            Accurate as of October 31, 2023 11:21 AM. If you have any questions, ask your nurse or doctor.                CONTINUE taking these medications      bisoprolol 5 MG tablet  Commonly known as: ZEBETA     hydroCHLOROthiazide 25 MG tablet  Commonly known as: HYDRODIURIL  Take 1 tablet (25 mg total) by mouth once daily.     losartan 100 MG tablet  Commonly known as: COZAAR  Take 1 tablet (100 mg total) by mouth once daily.     multivitamin per tablet  Commonly known as: THERAGRAN     NURTEC  "75 mg odt  Generic drug: rimegepant     paroxetine 10 MG tablet  Commonly known as: PAXIL              SOCIAL HISTORY:     Social History     Socioeconomic History    Marital status: Single   Tobacco Use    Smoking status: Never    Smokeless tobacco: Never   Substance and Sexual Activity    Alcohol use: Yes     Comment: Occassionally    Drug use: No    Sexual activity: Yes     Partners: Male     Birth control/protection: None       FAMILY HISTORY:     Family History   Problem Relation Age of Onset    Diabetes Other     Hypertension Other     Cancer Neg Hx        REVIEW OF SYSTEMS:   Review of Systems   Constitutional:  Negative for chills, diaphoresis and fever.   HENT:  Negative for nosebleeds.    Eyes:  Negative for blurred vision, double vision and photophobia.   Respiratory:  Negative for hemoptysis, shortness of breath and wheezing.    Cardiovascular:  Positive for palpitations. Negative for chest pain, orthopnea, claudication, leg swelling and PND.   Gastrointestinal:  Negative for abdominal pain, blood in stool, heartburn, melena, nausea and vomiting.   Genitourinary:  Negative for flank pain and hematuria.   Musculoskeletal:  Negative for falls, myalgias and neck pain.   Skin:  Negative for rash.   Neurological:  Negative for dizziness, seizures, loss of consciousness, weakness and headaches.   Endo/Heme/Allergies:  Negative for polydipsia. Does not bruise/bleed easily.   Psychiatric/Behavioral:  Negative for depression and memory loss. The patient is not nervous/anxious.        PHYSICAL EXAM:     Vitals:    10/31/23 1114   BP: (!) 158/80   Pulse: (!) 55   Resp: 17    Body mass index is 26.07 kg/m².  Weight: 71.1 kg (156 lb 10.2 oz)   Height: 5' 5" (165.1 cm)     Physical Exam  Vitals reviewed.   Constitutional:       General: She is not in acute distress.     Appearance: Normal appearance. She is well-developed. She is not ill-appearing, toxic-appearing or diaphoretic.   HENT:      Head: Normocephalic and " atraumatic.   Eyes:      General: No scleral icterus.     Extraocular Movements: Extraocular movements intact.      Conjunctiva/sclera: Conjunctivae normal.      Pupils: Pupils are equal, round, and reactive to light.   Neck:      Thyroid: No thyromegaly.      Vascular: Normal carotid pulses. No carotid bruit or JVD.      Trachea: Trachea normal.   Cardiovascular:      Rate and Rhythm: Normal rate and regular rhythm.      Pulses:           Carotid pulses are 2+ on the right side and 2+ on the left side.     Heart sounds: S1 normal and S2 normal. No murmur heard.     No friction rub. No gallop.   Pulmonary:      Effort: Pulmonary effort is normal. No respiratory distress.      Breath sounds: Normal breath sounds. No stridor. No wheezing, rhonchi or rales.   Chest:      Chest wall: No tenderness.   Abdominal:      General: There is no distension.      Palpations: Abdomen is soft.   Musculoskeletal:         General: No swelling or tenderness. Normal range of motion.      Cervical back: Normal range of motion and neck supple. No edema or rigidity.      Right lower leg: No edema.      Left lower leg: No edema.   Feet:      Right foot:      Skin integrity: No ulcer.      Left foot:      Skin integrity: No ulcer.   Skin:     General: Skin is warm and dry.      Coloration: Skin is not jaundiced.   Neurological:      General: No focal deficit present.      Mental Status: She is alert and oriented to person, place, and time.      Cranial Nerves: No cranial nerve deficit.   Psychiatric:         Mood and Affect: Mood normal.         Speech: Speech normal.         Behavior: Behavior normal. Behavior is cooperative.         DATA:   EKG: (personally reviewed tracing)  8/15/23 SR 58, NSSTTW changes, similar to 8/14/23    Laboratory:  CBC:  Recent Labs   Lab 10/21/22  0721 02/20/23  0636 08/14/23  2231   WBC 5.69 6.36 7.31   Hemoglobin 11.9 L 12.6 13.2   Hematocrit 37.3 39.8 41.2   Platelets 203 238 199       CHEMISTRIES:  Recent  Labs   Lab 10/21/22  0721 02/20/23  0636 08/14/23  2323 08/15/23  0524 10/17/23  1530   Glucose 125 H 103 116 H 122 H 137 H   Sodium 145 140 141 141 145   Potassium 4.1 3.6 4.1 4.0 3.4 L   BUN 16 11 15 14 19   Creatinine 0.9 0.9 1.1 1.0 1.3   eGFR >60 >60 >60 >60 50 A   Calcium 9.3 9.3 9.3 9.4 10.1       CARDIAC BIOMARKERS:  Recent Labs   Lab 08/15/23  0104 08/15/23  0524 08/15/23  0811   Troponin I 0.100 H 0.143 H 0.071 H       COAGS:        LIPIDS/LFTS:  Recent Labs   Lab 02/20/23  0636 08/14/23  2323 08/15/23  0524   Cholesterol  --   --  188   Triglycerides  --   --  46   HDL  --   --  57   LDL Cholesterol  --   --  121.8   Non-HDL Cholesterol  --   --  131   AST 25 37 26   ALT 13 21 16     Lab Results   Component Value Date    TSH 1.569 08/15/2023     The 10-year ASCVD risk score (Derek DK, et al., 2019) is: 6.9%    Values used to calculate the score:      Age: 47 years      Sex: Female      Is Non- : Yes      Diabetic: No      Tobacco smoker: No      Systolic Blood Pressure: 158 mmHg      Is BP treated: Yes      HDL Cholesterol: 57 mg/dL      Total Cholesterol: 188 mg/dL      Cardiovascular Testing:  Holter 10/18/23    NSR. Heart rates varied between 47 and 124 BPM with an average of 63 BPM.    There were rare PVCs totalling 197 and averaging 4.1 per hour.    There were occasional PACs totalling 496 and averaging 10.33 per hour.    Cath/renal angio 8/15/23  LVEDP: 12mmHg  LVEF: 65-70% by echo  Dominance: Right  LM: normal  LAD: normal  LCx: normal  RCA: normal  R renal: normal  L renal: normal  Hemostasis:  R Radial band  Impression:  CP/HTN emerg with elev trop  Normal cors/LV fxn  Normal renal arteries bilaterally  R rad vasband for hemostasis  Plan:  Cont med rx  Stop ASA/Plavix  Titrate antihtn meds  Home today  Follow up with Dr. Jacob  Consider as a candidate for renal denervation in the future.    Echo 8/15/23    Left Ventricle: The left ventricle is normal in size. Normal  wall thickness. There is concentric remodeling. Normal wall motion. There is normal systolic function with a visually estimated ejection fraction of 65 - 70%. There is normal diastolic function.    Right Ventricle: Normal right ventricular cavity size. Wall thickness is normal. Right ventricle wall motion  is normal. Systolic function is normal.    Tricuspid Valve: There is mild regurgitation.    Pulmonary Artery: The estimated pulmonary artery systolic pressure is 34 mmHg.    Pericardium: Trivial pericardial effusion present. No indication of cardiac tamponade.    ASSESSMENT:   # HTN, uncontrolled.  Prev intol of amlod (edema).  # Palps, persistent, improved on bisoprolol (metoprolol ineffective).  Holter 10/2023 neg.  # ?LORENZO    PLAN:   Cont med rx  EVM  Add aldactone 25mg qd  Check BMP 1 week  RN BP check 2 weeks (Dr. Watt)  Sleep med eval  Next drug: titrate aldact +/- nifedipine (LE edema with amlodipine) +/- hydrala/imdur  RTC 6 months (Apr 2024)  Consider as a candidate for renal denervation when available.      Zane Jacob MD, FACC

## 2023-10-31 NOTE — TELEPHONE ENCOUNTER
Patient's PCP has been contacted and patient is scheduled for 11/15/23 @ 9:30 with Dr. Watt for a blood pressure recheck with in 2 weeks.

## 2023-11-06 ENCOUNTER — LAB VISIT (OUTPATIENT)
Dept: LAB | Facility: HOSPITAL | Age: 48
End: 2023-11-06
Attending: INTERNAL MEDICINE
Payer: COMMERCIAL

## 2023-11-06 DIAGNOSIS — I10 ESSENTIAL HYPERTENSION: ICD-10-CM

## 2023-11-06 LAB
ANION GAP SERPL CALC-SCNC: 11 MMOL/L (ref 8–16)
BUN SERPL-MCNC: 21 MG/DL (ref 6–20)
CALCIUM SERPL-MCNC: 10.4 MG/DL (ref 8.7–10.5)
CHLORIDE SERPL-SCNC: 105 MMOL/L (ref 95–110)
CO2 SERPL-SCNC: 27 MMOL/L (ref 23–29)
CREAT SERPL-MCNC: 1.1 MG/DL (ref 0.5–1.4)
EST. GFR  (NO RACE VARIABLE): >60 ML/MIN/1.73 M^2
GLUCOSE SERPL-MCNC: 125 MG/DL (ref 70–110)
POTASSIUM SERPL-SCNC: 3.9 MMOL/L (ref 3.5–5.1)
SODIUM SERPL-SCNC: 143 MMOL/L (ref 136–145)

## 2023-11-06 PROCEDURE — 36415 COLL VENOUS BLD VENIPUNCTURE: CPT | Performed by: INTERNAL MEDICINE

## 2023-11-06 PROCEDURE — 80048 BASIC METABOLIC PNL TOTAL CA: CPT | Performed by: INTERNAL MEDICINE

## 2023-11-09 ENCOUNTER — CLINICAL SUPPORT (OUTPATIENT)
Dept: CARDIOLOGY | Facility: HOSPITAL | Age: 48
End: 2023-11-09
Attending: INTERNAL MEDICINE
Payer: COMMERCIAL

## 2023-11-09 DIAGNOSIS — R00.2 HEART PALPITATIONS: ICD-10-CM

## 2023-11-09 PROCEDURE — 93272 CARDIAC EVENT MONITOR (CUPID ONLY): ICD-10-PCS | Mod: ,,, | Performed by: INTERNAL MEDICINE

## 2023-11-09 PROCEDURE — 93270 REMOTE 30 DAY ECG REV/REPORT: CPT

## 2023-11-09 PROCEDURE — 93272 ECG/REVIEW INTERPRET ONLY: CPT | Mod: ,,, | Performed by: INTERNAL MEDICINE

## 2023-11-09 PROCEDURE — 93271 ECG/MONITORING AND ANALYSIS: CPT

## 2023-12-19 ENCOUNTER — TELEPHONE (OUTPATIENT)
Dept: CARDIOLOGY | Facility: CLINIC | Age: 48
End: 2023-12-19
Payer: COMMERCIAL

## 2023-12-19 NOTE — TELEPHONE ENCOUNTER
----- Message from Isaiah Suresh sent at 12/19/2023  8:58 AM CST -----  Regarding: Self [phone]  Type:  Patient Returning Call    Who Called:  Self     Who Left Message for Patient:  unknown     Does the patient know what this is regarding?: no     Would the patient rather a call back or a response via [facility]?  Call back     Best Call Back Number: [phone]     Additional Information:     Thank you.

## 2024-01-09 ENCOUNTER — OFFICE VISIT (OUTPATIENT)
Dept: CARDIOLOGY | Facility: CLINIC | Age: 49
End: 2024-01-09
Payer: COMMERCIAL

## 2024-01-09 VITALS
RESPIRATION RATE: 18 BRPM | HEIGHT: 65 IN | DIASTOLIC BLOOD PRESSURE: 84 MMHG | WEIGHT: 153.44 LBS | BODY MASS INDEX: 25.56 KG/M2 | SYSTOLIC BLOOD PRESSURE: 122 MMHG

## 2024-01-09 DIAGNOSIS — I47.19 PAROXYSMAL ATRIAL TACHYCARDIA: Primary | ICD-10-CM

## 2024-01-09 DIAGNOSIS — E78.5 DYSLIPIDEMIA: ICD-10-CM

## 2024-01-09 DIAGNOSIS — I10 ESSENTIAL HYPERTENSION: ICD-10-CM

## 2024-01-09 DIAGNOSIS — G47.33 OSA (OBSTRUCTIVE SLEEP APNEA): ICD-10-CM

## 2024-01-09 DIAGNOSIS — R00.2 HEART PALPITATIONS: ICD-10-CM

## 2024-01-09 PROCEDURE — 3079F DIAST BP 80-89 MM HG: CPT | Mod: CPTII,S$GLB,, | Performed by: INTERNAL MEDICINE

## 2024-01-09 PROCEDURE — 3008F BODY MASS INDEX DOCD: CPT | Mod: CPTII,S$GLB,, | Performed by: INTERNAL MEDICINE

## 2024-01-09 PROCEDURE — 1159F MED LIST DOCD IN RCRD: CPT | Mod: CPTII,S$GLB,, | Performed by: INTERNAL MEDICINE

## 2024-01-09 PROCEDURE — 3074F SYST BP LT 130 MM HG: CPT | Mod: CPTII,S$GLB,, | Performed by: INTERNAL MEDICINE

## 2024-01-09 PROCEDURE — 99214 OFFICE O/P EST MOD 30 MIN: CPT | Mod: S$GLB,,, | Performed by: INTERNAL MEDICINE

## 2024-01-09 PROCEDURE — 1160F RVW MEDS BY RX/DR IN RCRD: CPT | Mod: CPTII,S$GLB,, | Performed by: INTERNAL MEDICINE

## 2024-01-09 PROCEDURE — 99999 PR PBB SHADOW E&M-EST. PATIENT-LVL IV: CPT | Mod: PBBFAC,,, | Performed by: INTERNAL MEDICINE

## 2024-01-09 RX ORDER — FEZOLINETANT 45 MG/1
TABLET, FILM COATED ORAL
COMMUNITY
Start: 2023-06-23

## 2024-01-09 RX ORDER — ATORVASTATIN CALCIUM 20 MG/1
20 TABLET, FILM COATED ORAL NIGHTLY
Qty: 90 TABLET | Refills: 3 | Status: SHIPPED | OUTPATIENT
Start: 2024-01-09

## 2024-01-09 NOTE — PROGRESS NOTES
CARDIOVASCULAR PROGRESS NOTE    REASON FOR CONSULT:   Racquel Orozco is a 48 y.o. female who presents for follow up of HTN.    PCP: Alysa  HISTORY OF PRESENT ILLNESS:   The patient returns for follow up.  In the interim since her last visit, she underwent event monitoring which noted an episode of nonsustained atrial tachycardia.  She does continue to complain of episodic non limiting palpitations.  She has had no lightheadedness, dizziness, or syncope.  She denies any angina or dyspnea.  There has been no PND, orthopnea, melena, hematuria, or claudication symptoms.  Given the non limiting nature of her symptoms, I have not recommended further evaluation by electrophysiology.  Also in the interim since her last visit, she had a lipid panel drawn.  ASCVD calculator suggests statin benefit.    CARDIOVASCULAR HISTORY:   Parox atrial tach (EVM 11/2023)    PAST MEDICAL HISTORY:     Past Medical History:   Diagnosis Date    Herpes simplex without mention of complication     Hypertension     MVP (mitral valve prolapse)        PAST SURGICAL HISTORY:     Past Surgical History:   Procedure Laterality Date    ANGIOGRAM, RENAL ARTERIES, BILATERAL Bilateral 8/15/2023    Procedure: Angiogram, Renal Arteries, Bilateral;  Surgeon: Zane Jacob MD;  Location: [facility] CATH LAB;  Service: Cardiology;  Laterality: Bilateral;    DILATION AND CURETTAGE OF UTERUS      LEFT HEART CATHETERIZATION Left 8/15/2023    Procedure: Left heart cath;  Surgeon: Zane Jacob MD;  Location: [facility] CATH LAB;  Service: Cardiology;  Laterality: Left;  R rad access, not before 9am    VAGINAL DELIVERY      x4 normal       ALLERGIES AND MEDICATION:   Review of patient's allergies indicates:  No Known Allergies     Medication List            Accurate as of January 9, 2024 11:39 AM. If you have any questions, ask your nurse or doctor.                CONTINUE taking these medications      bisoprolol 5 MG tablet  Commonly known as: ZEBETA      hydroCHLOROthiazide 25 MG tablet  Commonly known as: HYDRODIURIL  Take 1 tablet (25 mg total) by mouth once daily.     losartan 100 MG tablet  Commonly known as: COZAAR  Take 1 tablet (100 mg total) by mouth once daily.     multivitamin per tablet  Commonly known as: THERAGRAN     NURTEC 75 mg odt  Generic drug: rimegepant     paroxetine 10 MG tablet  Commonly known as: PAXIL     spironolactone 25 MG tablet  Commonly known as: ALDACTONE  Take 1 tablet (25 mg total) by mouth once daily.     VEOZAH 45 mg Tab  Generic drug: fezolinetant              SOCIAL HISTORY:     Social History     Socioeconomic History    Marital status: Single   Tobacco Use    Smoking status: Never    Smokeless tobacco: Never   Substance and Sexual Activity    Alcohol use: Yes     Comment: Occassionally    Drug use: No    Sexual activity: Yes     Partners: Male     Birth control/protection: None       FAMILY HISTORY:     Family History   Problem Relation Age of Onset    Diabetes Other     Hypertension Other     Cancer Neg Hx        REVIEW OF SYSTEMS:   Review of Systems   Constitutional:  Negative for chills, diaphoresis and fever.   HENT:  Negative for nosebleeds.    Eyes:  Negative for blurred vision, double vision and photophobia.   Respiratory:  Negative for hemoptysis, shortness of breath and wheezing.    Cardiovascular:  Positive for palpitations. Negative for chest pain, orthopnea, claudication, leg swelling and PND.   Gastrointestinal:  Negative for abdominal pain, blood in stool, heartburn, melena, nausea and vomiting.   Genitourinary:  Negative for flank pain and hematuria.   Musculoskeletal:  Negative for falls, myalgias and neck pain.   Skin:  Negative for rash.   Neurological:  Negative for dizziness, seizures, loss of consciousness, weakness and headaches.   Endo/Heme/Allergies:  Negative for polydipsia. Does not bruise/bleed easily.   Psychiatric/Behavioral:  Negative for depression and memory loss. The patient is not  "nervous/anxious.        PHYSICAL EXAM:     Vitals:    01/09/24 1121   BP: 122/84   Resp: 18    Body mass index is 25.53 kg/m².  Weight: 69.6 kg (153 lb 7 oz)   Height: 5' 5" (165.1 cm)     Physical Exam  Vitals reviewed.   Constitutional:       General: She is not in acute distress.     Appearance: Normal appearance. She is well-developed. She is not ill-appearing, toxic-appearing or diaphoretic.   HENT:      Head: Normocephalic and atraumatic.   Eyes:      General: No scleral icterus.     Extraocular Movements: Extraocular movements intact.      Conjunctiva/sclera: Conjunctivae normal.      Pupils: Pupils are equal, round, and reactive to light.   Neck:      Thyroid: No thyromegaly.      Vascular: Normal carotid pulses. No carotid bruit or JVD.      Trachea: Trachea normal.   Cardiovascular:      Rate and Rhythm: Normal rate and regular rhythm.      Pulses:           Carotid pulses are 2+ on the right side and 2+ on the left side.     Heart sounds: S1 normal and S2 normal. No murmur heard.     No friction rub. No gallop.   Pulmonary:      Effort: Pulmonary effort is normal. No respiratory distress.      Breath sounds: Normal breath sounds. No stridor. No wheezing, rhonchi or rales.   Chest:      Chest wall: No tenderness.   Abdominal:      General: There is no distension.      Palpations: Abdomen is soft.   Musculoskeletal:         General: No swelling or tenderness. Normal range of motion.      Cervical back: Normal range of motion and neck supple. No edema or rigidity.      Right lower leg: No edema.      Left lower leg: No edema.   Feet:      Right foot:      Skin integrity: No ulcer.      Left foot:      Skin integrity: No ulcer.   Skin:     General: Skin is warm and dry.      Coloration: Skin is not jaundiced.   Neurological:      General: No focal deficit present.      Mental Status: She is alert and oriented to person, place, and time.      Cranial Nerves: No cranial nerve deficit.   Psychiatric:         " Mood and Affect: Mood normal.         Speech: Speech normal.         Behavior: Behavior normal. Behavior is cooperative.         DATA:   EKG: (personally reviewed tracing)  8/15/23 SR 58, NSSTTW changes, similar to 8/14/23    Laboratory:  CBC:  Recent Labs   Lab 10/21/22  0721 02/20/23  0636 08/14/23  2231   WBC 5.69 6.36 7.31   Hemoglobin 11.9 L 12.6 13.2   Hematocrit 37.3 39.8 41.2   Platelets 203 238 199       CHEMISTRIES:  Recent Labs   Lab 02/20/23  0636 08/14/23  2323 08/15/23  0524 10/17/23  1530 11/06/23  1212   Glucose 103 116 H 122 H 137 H 125 H   Sodium 140 141 141 145 143   Potassium 3.6 4.1 4.0 3.4 L 3.9   BUN 11 15 14 19 21 H   Creatinine 0.9 1.1 1.0 1.3 1.1   eGFR >60 >60 >60 50 A >60   Calcium 9.3 9.3 9.4 10.1 10.4       CARDIAC BIOMARKERS:  Recent Labs   Lab 08/15/23  0104 08/15/23  0524 08/15/23  0811   Troponin I 0.100 H 0.143 H 0.071 H       COAGS:        LIPIDS/LFTS:  Recent Labs   Lab 02/20/23  0636 08/14/23 2323 08/15/23  0524   Cholesterol  --   --  188   Triglycerides  --   --  46   HDL  --   --  57   LDL Cholesterol  --   --  121.8   Non-HDL Cholesterol  --   --  131   AST 25 37 26   ALT 13 21 16     Lab Results   Component Value Date    TSH 1.569 08/15/2023     The 10-year ASCVD risk score (Derek NORRIS, et al., 2019) is: 2.5%    Values used to calculate the score:      Age: 48 years      Sex: Female      Is Non- : Yes      Diabetic: No      Tobacco smoker: No      Systolic Blood Pressure: 122 mmHg      Is BP treated: Yes      HDL Cholesterol: 57 mg/dL      Total Cholesterol: 188 mg/dL      Cardiovascular Testing:  EVM 11/9/23  The baseline rhythm was sinus with a rate of 53 bpm.  There were 4 patient activations for symptoms of palpitations or racing/fluttering. Sinus rhythm with PVCs were observed on 3 activations. Sinus rhythm with a 14 beat run of nonsustained atrial tachycardia (rate of 163bpm) was observed on the other activation.  There was 1 patient activation  without symptoms entered corresponding to sinus rhythm.  There were no auto-activations.  The sinus rate range across activations was 56-62 bpm.     Holter 10/18/23    NSR. Heart rates varied between 47 and 124 BPM with an average of 63 BPM.    There were rare PVCs totalling 197 and averaging 4.1 per hour.    There were occasional PACs totalling 496 and averaging 10.33 per hour.    Cath/renal angio 8/15/23  LVEDP: 12mmHg  LVEF: 65-70% by echo  Dominance: Right  LM: normal  LAD: normal  LCx: normal  RCA: normal  R renal: normal  L renal: normal  Hemostasis:  R Radial band  Impression:  CP/HTN emerg with elev trop  Normal cors/LV fxn  Normal renal arteries bilaterally  R rad vasband for hemostasis  Plan:  Cont med rx  Stop ASA/Plavix  Titrate antihtn meds  Home today  Follow up with Dr. Jacob  Consider as a candidate for renal denervation in the future.    Echo 8/15/23    Left Ventricle: The left ventricle is normal in size. Normal wall thickness. There is concentric remodeling. Normal wall motion. There is normal systolic function with a visually estimated ejection fraction of 65 - 70%. There is normal diastolic function.    Right Ventricle: Normal right ventricular cavity size. Wall thickness is normal. Right ventricle wall motion  is normal. Systolic function is normal.    Tricuspid Valve: There is mild regurgitation.    Pulmonary Artery: The estimated pulmonary artery systolic pressure is 34 mmHg.    Pericardium: Trivial pericardial effusion present. No indication of cardiac tamponade.    ASSESSMENT:   # HTN, controlled.  Prev intol of amlod (edema).  # Palps, occasional but persistent, improved on bisoprolol (metoprolol ineffective).  Holter 10/2023 neg.  EVM 11/9/23 with nonsustained atrial tachycardia.  # ?LORENZO, eval planned 2/5/24  # dyslipidemia, ASCVD calc suggests statin benefit    PLAN:   Cont med rx  Add atorva 20mg qhs  Sleep med eval pending  Next drug: titrate aldact +/- nifedipine (LE edema with  amlodipine) +/- hydrala/imdur  RTC 6 months with lipids/LFT (July 2024)  Consider as a candidate for renal denervation when available.      Zane Jacob MD, FACC

## 2024-01-09 NOTE — LETTER
January 9, 2024        Yony Watt MD  [address] [facility]  [place] [address]             [facility] - Cardiology  [address]  [address]  [place] [address]  Phone: [phone]   Patient: Racquel Orozco   MR Number: [id number]   YOB: 1975   Date of Visit: 1/9/2024       Dear Dr. Watt:    Thank you for referring Racquel Orozco to me for evaluation. Attached you will find relevant portions of my assessment and plan of care.    If you have questions, please do not hesitate to call me. I look forward to following Racquel Orozco along with you.    Sincerely,      Zane Jacob MD            CC  No Recipients    Enclosure

## 2024-02-27 ENCOUNTER — HOSPITAL ENCOUNTER (EMERGENCY)
Facility: HOSPITAL | Age: 49
Discharge: HOME | End: 2024-02-27
Attending: EMERGENCY MEDICINE
Payer: COMMERCIAL

## 2024-02-27 VITALS
OXYGEN SATURATION: 100 % | BODY MASS INDEX: 25.49 KG/M2 | DIASTOLIC BLOOD PRESSURE: 65 MMHG | HEIGHT: 65 IN | RESPIRATION RATE: 18 BRPM | TEMPERATURE: 99 F | HEART RATE: 58 BPM | WEIGHT: 153 LBS | SYSTOLIC BLOOD PRESSURE: 137 MMHG

## 2024-02-27 DIAGNOSIS — N20.0 KIDNEY STONE ON LEFT SIDE: Primary | ICD-10-CM

## 2024-02-27 DIAGNOSIS — N17.9 AKI (ACUTE KIDNEY INJURY): ICD-10-CM

## 2024-02-27 DIAGNOSIS — R31.9 HEMATURIA, UNSPECIFIED TYPE: ICD-10-CM

## 2024-02-27 LAB
ALBUMIN SERPL BCP-MCNC: 4.2 G/DL (ref 3.5–5.2)
ALP SERPL-CCNC: 145 U/L (ref 55–135)
ALT SERPL W/O P-5'-P-CCNC: 17 U/L (ref 10–44)
ANION GAP SERPL CALC-SCNC: 10 MMOL/L (ref 8–16)
AST SERPL-CCNC: 25 U/L (ref 10–40)
BASOPHILS # BLD AUTO: 0.03 K/UL (ref 0–0.2)
BASOPHILS NFR BLD: 0.4 % (ref 0–1.9)
BILIRUB SERPL-MCNC: 0.3 MG/DL (ref 0.1–1)
BILIRUB UR QL STRIP: NEGATIVE
BUN SERPL-MCNC: 33 MG/DL (ref 6–20)
CALCIUM SERPL-MCNC: 9.6 MG/DL (ref 8.7–10.5)
CHLORIDE SERPL-SCNC: 106 MMOL/L (ref 95–110)
CLARITY UR: ABNORMAL
CO2 SERPL-SCNC: 27 MMOL/L (ref 23–29)
COLOR UR: ABNORMAL
CREAT SERPL-MCNC: 1.5 MG/DL (ref 0.5–1.4)
DIFFERENTIAL METHOD BLD: ABNORMAL
EOSINOPHIL # BLD AUTO: 0.1 K/UL (ref 0–0.5)
EOSINOPHIL NFR BLD: 1.7 % (ref 0–8)
ERYTHROCYTE [DISTWIDTH] IN BLOOD BY AUTOMATED COUNT: 13 % (ref 11.5–14.5)
EST. GFR  (NO RACE VARIABLE): 42 ML/MIN/1.73 M^2
GLUCOSE SERPL-MCNC: 78 MG/DL (ref 70–110)
GLUCOSE UR QL STRIP: NEGATIVE
HCT VFR BLD AUTO: 39.2 % (ref 37–48.5)
HGB BLD-MCNC: 12.3 G/DL (ref 12–16)
HGB UR QL STRIP: ABNORMAL
IMM GRANULOCYTES # BLD AUTO: 0.02 K/UL (ref 0–0.04)
IMM GRANULOCYTES NFR BLD AUTO: 0.3 % (ref 0–0.5)
KETONES UR QL STRIP: NEGATIVE
LEUKOCYTE ESTERASE UR QL STRIP: ABNORMAL
LIPASE SERPL-CCNC: 72 U/L (ref 4–60)
LYMPHOCYTES # BLD AUTO: 2.4 K/UL (ref 1–4.8)
LYMPHOCYTES NFR BLD: 35.2 % (ref 18–48)
MCH RBC QN AUTO: 28.5 PG (ref 27–31)
MCHC RBC AUTO-ENTMCNC: 31.4 G/DL (ref 32–36)
MCV RBC AUTO: 91 FL (ref 82–98)
MICROSCOPIC COMMENT: ABNORMAL
MONOCYTES # BLD AUTO: 0.5 K/UL (ref 0.3–1)
MONOCYTES NFR BLD: 6.5 % (ref 4–15)
NEUTROPHILS # BLD AUTO: 3.9 K/UL (ref 1.8–7.7)
NEUTROPHILS NFR BLD: 55.9 % (ref 38–73)
NITRITE UR QL STRIP: NEGATIVE
NRBC BLD-RTO: 0 /100 WBC
PH UR STRIP: 6 [PH] (ref 5–8)
PLATELET # BLD AUTO: 172 K/UL (ref 150–450)
PLATELET BLD QL SMEAR: ABNORMAL
PMV BLD AUTO: 11.2 FL (ref 9.2–12.9)
POTASSIUM SERPL-SCNC: 4.4 MMOL/L (ref 3.5–5.1)
PROT SERPL-MCNC: 7.9 G/DL (ref 6–8.4)
PROT UR QL STRIP: ABNORMAL
RBC # BLD AUTO: 4.32 M/UL (ref 4–5.4)
RBC #/AREA URNS HPF: >100 /HPF (ref 0–4)
SODIUM SERPL-SCNC: 143 MMOL/L (ref 136–145)
SP GR UR STRIP: 1.02 (ref 1–1.03)
SQUAMOUS #/AREA URNS HPF: 9 /HPF
URN SPEC COLLECT METH UR: ABNORMAL
UROBILINOGEN UR STRIP-ACNC: NEGATIVE EU/DL
WBC # BLD AUTO: 6.91 K/UL (ref 3.9–12.7)
WBC #/AREA URNS HPF: 0 /HPF (ref 0–5)
WBC TOXIC VACUOLES BLD QL SMEAR: PRESENT

## 2024-02-27 PROCEDURE — 63600175 PHARM REV CODE 636 W HCPCS

## 2024-02-27 PROCEDURE — 81000 URINALYSIS NONAUTO W/SCOPE: CPT | Performed by: PHYSICIAN ASSISTANT

## 2024-02-27 PROCEDURE — 80053 COMPREHEN METABOLIC PANEL: CPT

## 2024-02-27 PROCEDURE — 85025 COMPLETE CBC W/AUTO DIFF WBC: CPT

## 2024-02-27 PROCEDURE — 83690 ASSAY OF LIPASE: CPT

## 2024-02-27 PROCEDURE — 99285 EMERGENCY DEPT VISIT HI MDM: CPT | Mod: 25

## 2024-02-27 PROCEDURE — 87491 CHLMYD TRACH DNA AMP PROBE: CPT | Performed by: PHYSICIAN ASSISTANT

## 2024-02-27 PROCEDURE — 96361 HYDRATE IV INFUSION ADD-ON: CPT

## 2024-02-27 PROCEDURE — 25000003 PHARM REV CODE 250

## 2024-02-27 PROCEDURE — 96374 THER/PROPH/DIAG INJ IV PUSH: CPT

## 2024-02-27 RX ORDER — HYDROCODONE BITARTRATE AND ACETAMINOPHEN 5; 325 MG/1; MG/1
1 TABLET ORAL EVERY 6 HOURS PRN
Qty: 12 TABLET | Refills: 0 | Status: SHIPPED | OUTPATIENT
Start: 2024-02-27

## 2024-02-27 RX ORDER — KETOROLAC TROMETHAMINE 30 MG/ML
15 INJECTION, SOLUTION INTRAMUSCULAR; INTRAVENOUS
Status: COMPLETED | OUTPATIENT
Start: 2024-02-27 | End: 2024-02-27

## 2024-02-27 RX ADMIN — SODIUM CHLORIDE 1000 ML: 9 INJECTION, SOLUTION INTRAVENOUS at 01:02

## 2024-02-27 RX ADMIN — KETOROLAC TROMETHAMINE 15 MG: 30 INJECTION, SOLUTION INTRAMUSCULAR; INTRAVENOUS at 01:02

## 2024-02-27 NOTE — ED PROVIDER NOTES
"Encounter Date: 2/27/2024       History     Chief Complaint   Patient presents with    Hematuria     Pt to ER with reports of hematuria starting this morning. Pt reports seen at PCP and prescribed abx. Pt reports came to ER due to it being " a lot of blood". + dysuria. + frequency. Pt reports bilateral flank " pressure"       48-year-old female with a past medical history MVP, hypertension, HSV presents to the ED for hematuria.  Patient states this started on Sunday.  Patient complaining of bilateral flank pain left worse than right that is pressure that comes and goes, she rates a 2/10.  No medications prior to arrival.  Patient denies any history of kidney stones.  Patient states she does have history of UTIs.  Patient states she saw her PCP on Monday and they sent her home with Cefuroxime 500 mg.  Patient has taken 1 dose today.  Patient is postmenopausal.  Patient admits to bladder spasm, urgency, and hematuria.  Patient denies fever, sweats, chills, abdominal pain, nausea, vomiting, diarrhea, constipation, dysuria, frequency, decreased urine, vaginal bleeding, vaginal discharge, lower back pain, headaches, dizziness, lightheadedness, and rashes.      Review of patient's allergies indicates:  No Known Allergies  Past Medical History:   Diagnosis Date    Herpes simplex without mention of complication     Hypertension     MVP (mitral valve prolapse)      Past Surgical History:   Procedure Laterality Date    ANGIOGRAM, RENAL ARTERIES, BILATERAL Bilateral 8/15/2023    Procedure: Angiogram, Renal Arteries, Bilateral;  Surgeon: Zane Jacob MD;  Location: [facility] CATH LAB;  Service: Cardiology;  Laterality: Bilateral;    DILATION AND CURETTAGE OF UTERUS      LEFT HEART CATHETERIZATION Left 8/15/2023    Procedure: Left heart cath;  Surgeon: Zane Jacob MD;  Location: [facility] CATH LAB;  Service: Cardiology;  Laterality: Left;  R rad access, not before 9am    VAGINAL DELIVERY      x4 normal     Family History "   Problem Relation Age of Onset    Diabetes Other     Hypertension Other     Cancer Neg Hx      Social History     Tobacco Use    Smoking status: Never    Smokeless tobacco: Never   Substance Use Topics    Alcohol use: Yes     Comment: Occassionally    Drug use: No     Review of Systems   Constitutional:  Negative for chills, diaphoresis and fever.   Respiratory:  Negative for shortness of breath.    Cardiovascular:  Negative for chest pain.   Gastrointestinal:  Negative for abdominal pain, constipation, diarrhea, nausea and vomiting.   Genitourinary:  Positive for flank pain (bilateral; L>R), hematuria and urgency. Negative for decreased urine volume, difficulty urinating, dysuria, frequency, pelvic pain, vaginal bleeding, vaginal discharge and vaginal pain.        (+) Bladder spasms   Musculoskeletal:  Negative for back pain.   Skin:  Negative for rash.   Neurological:  Negative for dizziness, weakness, light-headedness and headaches.       Physical Exam     Initial Vitals [02/27/24 1150]   BP Pulse Resp Temp SpO2   (!) 112/55 63 20 98.8 °F (37.1 °C) 100 %      MAP       --         Physical Exam    Nursing note and vitals reviewed.  Constitutional: She appears well-developed and well-nourished. She is not diaphoretic. She is active. She does not appear ill. No distress.   HENT:   Head: Normocephalic and atraumatic.   Right Ear: External ear normal.   Left Ear: External ear normal.   Nose: Nose normal.   Eyes: Conjunctivae and EOM are normal. Pupils are equal, round, and reactive to light. Right eye exhibits no discharge. Left eye exhibits no discharge.   Neck: Phonation normal. Neck supple.   Normal range of motion.   Full passive range of motion without pain.     Pulmonary/Chest: Effort normal. No respiratory distress.   Abdominal: Abdomen is soft. She exhibits no distension. There is no abdominal tenderness.   No right CVA tenderness.  There is left CVA tenderness. There is no rebound and no guarding.    Musculoskeletal:         General: Normal range of motion.      Cervical back: Full passive range of motion without pain, normal range of motion and neck supple.     Neurological: She is alert and oriented to person, place, and time. She has normal strength. No sensory deficit. GCS eye subscore is 4. GCS verbal subscore is 5. GCS motor subscore is 6.   Skin: Skin is dry. Capillary refill takes less than 2 seconds.         ED Course   Procedures  Labs Reviewed   URINALYSIS, REFLEX TO URINE CULTURE - Abnormal; Notable for the following components:       Result Value    Color, UA Orange (*)     Appearance, UA Hazy (*)     Protein, UA Trace (*)     Occult Blood UA 3+ (*)     Leukocytes, UA Trace (*)     All other components within normal limits    Narrative:     Specimen Source->Urine   URINALYSIS MICROSCOPIC - Abnormal; Notable for the following components:    RBC, UA >100 (*)     All other components within normal limits    Narrative:     Specimen Source->Urine   CBC W/ AUTO DIFFERENTIAL - Abnormal; Notable for the following components:    MCHC 31.4 (*)     All other components within normal limits   COMPREHENSIVE METABOLIC PANEL - Abnormal; Notable for the following components:    BUN 33 (*)     Creatinine 1.5 (*)     Alkaline Phosphatase 145 (*)     eGFR 42 (*)     All other components within normal limits   LIPASE - Abnormal; Notable for the following components:    Lipase 72 (*)     All other components within normal limits   C. TRACHOMATIS/N. GONORRHOEAE BY AMP DNA          Imaging Results              CT Renal Stone Study ABD Pelvis WO (Final result)  Result time 02/27/24 13:58:07      Final result by Ashwin Walker MD (02/27/24 13:58:07)                   Impression:      3 mm nonobstructing left interpolar calculus.      Electronically signed by: Ashwin Walker MD  Date:    02/27/2024  Time:    13:58               Narrative:    EXAMINATION:  CT RENAL STONE STUDY ABD PELVIS WO    CLINICAL  HISTORY:  Flank pain, kidney stone suspected;left;    TECHNIQUE:  Low dose axial images, sagittal and coronal reformations were obtained from the lung bases to the pubic symphysis.  Contrast was not administered.    COMPARISON:  None    FINDINGS:  The limited evaluation of structures at the base of the chest show no concerning masses or nodules.    The liver is normal in size.  No solid mass noting lack of IV contrast.  No biliary ductal dilatation.  Gallbladder is contracted but grossly unremarkable.    The spleen, adrenal glands, and pancreas show no focal abnormalities noting lack of IV contrast.    Bilateral kidneys are normal in size and position.  Tiny, 3 mm nonobstructing interpolar calculus in the left kidney.  No abnormal calcifications on the right.  No hydronephrosis.  Urinary bladder shows no focal wall thickening.  Uterus is grossly unremarkable.    Gastrointestinal tract shows no wall thickening or obstruction.  No free fluid or free gas.  A normal appendix is present.  No concerning lymphadenopathy.    Vascular structures show normal course and caliber.  No evidence of an acute fracture or destructive osseous lesion.                                       Medications   sodium chloride 0.9% bolus 1,000 mL 1,000 mL (1,000 mLs Intravenous New Bag 2/27/24 1322)   ketorolac injection 15 mg (15 mg Intravenous Given 2/27/24 1321)     Medical Decision Making  48-year-old female with a past medical history MVP, hypertension, HSV presents to the ED for hematuria.   Patient's chart and medical history reviewed.    Ddx:  UTI  Pyelonephritis  Nephrolithiasis    Patient's vitals reviewed.  Afebrile, no respiratory distress, and nontoxic-appearing in the ED. patient had left CVA tenderness, otherwise unremarkable exam.  Patient started on fluids and given Toradol for pain.  UA was unremarkable for infection, 3+ blood, greater than 100 red blood cells.  GC pending.  Lipase slightly elevated to 72, unlikely  pancreatitis not 3 times the upper limit.  CBC unremarkable. CMP showed elevated BUN and creatinine of 33 and 1.5 respectively with a GFR 42. CT showed 3 mm nonobstructing left interpolar calculus. Nursing staff struggling to get an IV line in to give fluids.  Patient given 1 L of fluids.  Patient will be sent a referral to Urology for further management of this kidney stone and hematuria.  Instructed patient to rest and stay well hydrated, she verbalized understanding.  Patient was sent home on Max as needed for pain. I have reviewed the  for this patient. Patient agrees with this plan. Discussed with her strict return precautions, she verbalized understanding. Patient is stable for discharge.       Amount and/or Complexity of Data Reviewed  Labs: ordered.  Radiology: ordered.    Risk  Prescription drug management.                                      Clinical Impression:  Final diagnoses:  [R31.9] Hematuria, unspecified type  [N20.0] Kidney stone on left side (Primary)  [N17.9] KURTIS (acute kidney injury)          ED Disposition Condition    Discharge Stable          ED Prescriptions       Medication Sig Dispense Start Date End Date Auth. Provider    HYDROcodone-acetaminophen (NORCO) 5-325 mg per tablet Take 1 tablet by mouth every 6 (six) hours as needed for Pain. 12 tablet 2/27/2024 -- Holdsworth, Alayna, PA-C          Follow-up Information       Follow up With Specialties Details Why Contact Info    Pierce Sotomayor MD Urology Schedule an appointment as soon as possible for a visit   [address]   [facility] [address]  [phone]               Holdsworth, Alayna, PA-C  02/27/24 2745

## 2024-02-27 NOTE — DISCHARGE INSTRUCTIONS

## 2024-02-29 LAB
C TRACH DNA SPEC QL NAA+PROBE: NOT DETECTED
N GONORRHOEA DNA SPEC QL NAA+PROBE: NOT DETECTED

## 2024-03-04 ENCOUNTER — OFFICE VISIT (OUTPATIENT)
Dept: UROLOGY | Facility: CLINIC | Age: 49
End: 2024-03-04
Payer: COMMERCIAL

## 2024-03-04 DIAGNOSIS — R31.0 GROSS HEMATURIA: Primary | ICD-10-CM

## 2024-03-04 DIAGNOSIS — N20.0 KIDNEY STONE ON LEFT SIDE: ICD-10-CM

## 2024-03-04 DIAGNOSIS — N20.0 NEPHROLITHIASIS: ICD-10-CM

## 2024-03-04 DIAGNOSIS — R10.9 RIGHT FLANK PAIN: ICD-10-CM

## 2024-03-04 PROCEDURE — 99204 OFFICE O/P NEW MOD 45 MIN: CPT | Mod: S$GLB,,, | Performed by: UROLOGY

## 2024-03-04 PROCEDURE — 4010F ACE/ARB THERAPY RXD/TAKEN: CPT | Mod: CPTII,S$GLB,, | Performed by: UROLOGY

## 2024-03-04 PROCEDURE — 99999 PR PBB SHADOW E&M-EST. PATIENT-LVL III: CPT | Mod: PBBFAC,,, | Performed by: UROLOGY

## 2024-03-04 PROCEDURE — 1159F MED LIST DOCD IN RCRD: CPT | Mod: CPTII,S$GLB,, | Performed by: UROLOGY

## 2024-03-04 PROCEDURE — 1160F RVW MEDS BY RX/DR IN RCRD: CPT | Mod: CPTII,S$GLB,, | Performed by: UROLOGY

## 2024-03-04 PROCEDURE — 82365 CALCULUS SPECTROSCOPY: CPT | Performed by: UROLOGY

## 2024-03-04 NOTE — PROGRESS NOTES
Subjective:       Racquel Orozco is a 48 y.o. female who is a new patient who was referred by Alayna Holdsworth  for evaluation of hematuria.      She was seen in ER 2/27/24 with gross hematuria. States urine looked like cranberry juice. Prior to that was treated for UTI by PCP (no UCx available to me, given cefuroxime but did not take).  She states UA was normal at time of PCP visit. +urgency, frequency, spasms, R>L flank pain. R flank pain has persisted. Urgency has improved. States she is chronically dehydrated - on HCTZ and spironolactone.     Recent CT showed small 3mm L renal stone, nonobstructing. Possible R UVJ stone, no hydro (not on read).    She reports passing a stone since ER visit, she has this with her today. Appears c/w stone. Still with LBP. No further hematuria.      The following portions of the patient's history were reviewed and updated as appropriate: allergies, current medications, past family history, past medical history, past social history, past surgical history and problem list.    Review of Systems  12 point review of systems completed. Pertinent positive and negatives listed in HPI        Objective:    Vitals: There were no vitals taken for this visit.    Physical Exam   General: well developed, well nourished in no acute distress  Head: normocephalic, atraumatic  Neck: no obvious enlargement of thyroid  HEENT: EOMI, mucus membranes moist, sclera anicteric, no hearing impairment  Lungs: symmetric expansion, non-labored breathing  Neuro: alert and oriented x 3, no gross deficits  Psych: normal judgment and insight, normal mood/affect and non-anxious  Genitourinary:   deferred      Lab Review   Urine analysis today in clinic shows no urine     Lab Results   Component Value Date    WBC 6.91 02/27/2024    HGB 12.3 02/27/2024    HCT 39.2 02/27/2024    MCV 91 02/27/2024     02/27/2024     Lab Results   Component Value Date    CREATININE 1.5 (H) 02/27/2024    BUN 33 (H) 02/27/2024        Imaging  Images and reports were personally reviewed by me and discussed with patient  CT RSS        Assessment/Plan:      1. Gross hematuria    - Discussed etiology and workup of hematuria   - UCx - recently negative   - Cytology - not indicated   - CT urogram   - Office cystoscopy     2. Kidney stone on left side    - 3mm L renal stone, nonobstructive     3. Right flank pain / nephrolithiasis   - Concern for 4mm R UVJ stone, not on original read of CT. Phleboliths also noted in pelvic. No R hydro at that time   - Stone specimen today c/w stone size from CT   - Stone for analysis         Follow up for cysto

## 2024-03-07 LAB
COMPN STONE: NORMAL
LABORATORY COMMENT REPORT: NORMAL
SPECIMEN SOURCE: NORMAL
STONE ANALYSIS IR-IMP: NORMAL

## 2024-03-11 ENCOUNTER — HOSPITAL ENCOUNTER (OUTPATIENT)
Dept: RADIOLOGY | Facility: HOSPITAL | Age: 49
Discharge: HOME | End: 2024-03-11
Attending: UROLOGY
Payer: COMMERCIAL

## 2024-03-11 DIAGNOSIS — N20.0 NEPHROLITHIASIS: ICD-10-CM

## 2024-03-11 DIAGNOSIS — R31.0 GROSS HEMATURIA: ICD-10-CM

## 2024-03-11 PROCEDURE — 25500020 PHARM REV CODE 255: Performed by: UROLOGY

## 2024-03-11 PROCEDURE — 74178 CT ABD&PLV WO CNTR FLWD CNTR: CPT | Mod: TC

## 2024-03-11 PROCEDURE — 74178 CT ABD&PLV WO CNTR FLWD CNTR: CPT | Mod: 26,,, | Performed by: INTERNAL MEDICINE

## 2024-03-11 RX ADMIN — IOHEXOL 125 ML: 350 INJECTION, SOLUTION INTRAVENOUS at 10:03

## 2024-04-01 ENCOUNTER — TELEPHONE (OUTPATIENT)
Dept: UROLOGY | Facility: CLINIC | Age: 49
End: 2024-04-01
Payer: COMMERCIAL

## 2024-04-01 NOTE — TELEPHONE ENCOUNTER
----- Message from Gerda Bruner sent at 4/1/2024 11:57 AM CDT -----  Regarding: self [phone]  .Type:  Patient Returning Call    Who Called: self    Who Left Message for Patient: unknown    Does the patient know what this is regarding?yes    Would the patient rather a call back or a response via [facility]? Call back    Best Call Back Number: [phone]

## 2024-04-17 ENCOUNTER — HOSPITAL ENCOUNTER (EMERGENCY)
Facility: HOSPITAL | Age: 49
Discharge: HOME | End: 2024-04-18
Attending: EMERGENCY MEDICINE
Payer: COMMERCIAL

## 2024-04-17 DIAGNOSIS — R20.2 PARESTHESIA OF LEFT UPPER AND LOWER EXTREMITY: Primary | ICD-10-CM

## 2024-04-17 DIAGNOSIS — R94.4 DECREASED GFR: ICD-10-CM

## 2024-04-17 DIAGNOSIS — R20.0 LEFT SIDED NUMBNESS: ICD-10-CM

## 2024-04-17 DIAGNOSIS — D69.6 THROMBOCYTOPENIA: ICD-10-CM

## 2024-04-17 LAB
ALBUMIN SERPL BCP-MCNC: 4.5 G/DL (ref 3.5–5.2)
ALP SERPL-CCNC: 156 U/L (ref 55–135)
ALT SERPL W/O P-5'-P-CCNC: 18 U/L (ref 10–44)
ANION GAP SERPL CALC-SCNC: 11 MMOL/L (ref 8–16)
AST SERPL-CCNC: 28 U/L (ref 10–40)
BASOPHILS # BLD AUTO: 0.03 K/UL (ref 0–0.2)
BASOPHILS NFR BLD: 0.4 % (ref 0–1.9)
BILIRUB SERPL-MCNC: 0.3 MG/DL (ref 0.1–1)
BILIRUB UR QL STRIP: NEGATIVE
BUN SERPL-MCNC: 27 MG/DL (ref 6–20)
CALCIUM SERPL-MCNC: 10 MG/DL (ref 8.7–10.5)
CHLORIDE SERPL-SCNC: 103 MMOL/L (ref 95–110)
CHOLEST SERPL-MCNC: 141 MG/DL (ref 120–199)
CHOLEST/HDLC SERPL: 2.2 {RATIO} (ref 2–5)
CLARITY UR: CLEAR
CO2 SERPL-SCNC: 28 MMOL/L (ref 23–29)
COLOR UR: YELLOW
CREAT SERPL-MCNC: 1.3 MG/DL (ref 0.5–1.4)
CTP QC/QA: YES
DIFFERENTIAL METHOD BLD: ABNORMAL
EOSINOPHIL # BLD AUTO: 0.1 K/UL (ref 0–0.5)
EOSINOPHIL NFR BLD: 1.6 % (ref 0–8)
ERYTHROCYTE [DISTWIDTH] IN BLOOD BY AUTOMATED COUNT: 12.3 % (ref 11.5–14.5)
EST. GFR  (NO RACE VARIABLE): 50 ML/MIN/1.73 M^2
GLUCOSE SERPL-MCNC: 86 MG/DL (ref 70–110)
GLUCOSE UR QL STRIP: NEGATIVE
HCT VFR BLD AUTO: 39.8 % (ref 37–48.5)
HDLC SERPL-MCNC: 64 MG/DL (ref 40–75)
HDLC SERPL: 45.4 % (ref 20–50)
HGB BLD-MCNC: 12.5 G/DL (ref 12–16)
HGB UR QL STRIP: NEGATIVE
IMM GRANULOCYTES # BLD AUTO: 0.01 K/UL (ref 0–0.04)
IMM GRANULOCYTES NFR BLD AUTO: 0.1 % (ref 0–0.5)
INR PPP: 1.1 (ref 0.8–1.2)
KETONES UR QL STRIP: NEGATIVE
LDLC SERPL CALC-MCNC: 55.2 MG/DL (ref 63–159)
LEUKOCYTE ESTERASE UR QL STRIP: NEGATIVE
LYMPHOCYTES # BLD AUTO: 3.1 K/UL (ref 1–4.8)
LYMPHOCYTES NFR BLD: 43.6 % (ref 18–48)
MAGNESIUM SERPL-MCNC: 1.8 MG/DL (ref 1.6–2.6)
MCH RBC QN AUTO: 28.5 PG (ref 27–31)
MCHC RBC AUTO-ENTMCNC: 31.4 G/DL (ref 32–36)
MCV RBC AUTO: 91 FL (ref 82–98)
MONOCYTES # BLD AUTO: 0.4 K/UL (ref 0.3–1)
MONOCYTES NFR BLD: 5.8 % (ref 4–15)
NEUTROPHILS # BLD AUTO: 3.4 K/UL (ref 1.8–7.7)
NEUTROPHILS NFR BLD: 48.5 % (ref 38–73)
NITRITE UR QL STRIP: NEGATIVE
NONHDLC SERPL-MCNC: 77 MG/DL
NRBC BLD-RTO: 0 /100 WBC
PH UR STRIP: 6 [PH] (ref 5–8)
PLATELET # BLD AUTO: 105 K/UL (ref 150–450)
PLATELET BLD QL SMEAR: ABNORMAL
PMV BLD AUTO: 11.7 FL (ref 9.2–12.9)
POTASSIUM SERPL-SCNC: 4 MMOL/L (ref 3.5–5.1)
PROT SERPL-MCNC: 8.6 G/DL (ref 6–8.4)
PROT UR QL STRIP: NEGATIVE
PROTHROMBIN TIME: 12.1 SEC (ref 9–12.5)
RBC # BLD AUTO: 4.38 M/UL (ref 4–5.4)
SARS-COV-2 RDRP RESP QL NAA+PROBE: NEGATIVE
SODIUM SERPL-SCNC: 142 MMOL/L (ref 136–145)
SP GR UR STRIP: 1.02 (ref 1–1.03)
TRIGL SERPL-MCNC: 109 MG/DL (ref 30–150)
TSH SERPL DL<=0.005 MIU/L-ACNC: 1.83 UIU/ML (ref 0.4–4)
URN SPEC COLLECT METH UR: NORMAL
UROBILINOGEN UR STRIP-ACNC: NEGATIVE EU/DL
WBC # BLD AUTO: 7.02 K/UL (ref 3.9–12.7)

## 2024-04-17 PROCEDURE — 99285 EMERGENCY DEPT VISIT HI MDM: CPT | Mod: 25

## 2024-04-17 PROCEDURE — 93005 ELECTROCARDIOGRAM TRACING: CPT

## 2024-04-17 PROCEDURE — 80053 COMPREHEN METABOLIC PANEL: CPT | Performed by: NURSE PRACTITIONER

## 2024-04-17 PROCEDURE — 93010 ELECTROCARDIOGRAM REPORT: CPT | Mod: 76,,, | Performed by: INTERNAL MEDICINE

## 2024-04-17 PROCEDURE — 87635 SARS-COV-2 COVID-19 AMP PRB: CPT | Performed by: NURSE PRACTITIONER

## 2024-04-17 PROCEDURE — 85610 PROTHROMBIN TIME: CPT | Performed by: NURSE PRACTITIONER

## 2024-04-17 PROCEDURE — 83735 ASSAY OF MAGNESIUM: CPT | Performed by: NURSE PRACTITIONER

## 2024-04-17 PROCEDURE — 93010 ELECTROCARDIOGRAM REPORT: CPT | Mod: ,,, | Performed by: INTERNAL MEDICINE

## 2024-04-17 PROCEDURE — 63600175 PHARM REV CODE 636 W HCPCS: Performed by: PHYSICIAN ASSISTANT

## 2024-04-17 PROCEDURE — 81003 URINALYSIS AUTO W/O SCOPE: CPT | Performed by: PHYSICIAN ASSISTANT

## 2024-04-17 PROCEDURE — 85025 COMPLETE CBC W/AUTO DIFF WBC: CPT | Performed by: NURSE PRACTITIONER

## 2024-04-17 PROCEDURE — 84443 ASSAY THYROID STIM HORMONE: CPT | Performed by: NURSE PRACTITIONER

## 2024-04-17 PROCEDURE — 96360 HYDRATION IV INFUSION INIT: CPT

## 2024-04-17 PROCEDURE — 25000003 PHARM REV CODE 250: Performed by: PHYSICIAN ASSISTANT

## 2024-04-17 PROCEDURE — 25500020 PHARM REV CODE 255: Performed by: EMERGENCY MEDICINE

## 2024-04-17 PROCEDURE — 80061 LIPID PANEL: CPT | Performed by: NURSE PRACTITIONER

## 2024-04-17 RX ORDER — ASPIRIN 325 MG
325 TABLET ORAL DAILY
Qty: 30 TABLET | Refills: 0 | Status: SHIPPED | OUTPATIENT
Start: 2024-04-17 | End: 2024-05-17

## 2024-04-17 RX ORDER — ASPIRIN 325 MG
325 TABLET ORAL
Status: COMPLETED | OUTPATIENT
Start: 2024-04-17 | End: 2024-04-17

## 2024-04-17 RX ADMIN — IOHEXOL 75 ML: 350 INJECTION, SOLUTION INTRAVENOUS at 10:04

## 2024-04-17 RX ADMIN — SODIUM CHLORIDE, POTASSIUM CHLORIDE, SODIUM LACTATE AND CALCIUM CHLORIDE 1000 ML: 600; 310; 30; 20 INJECTION, SOLUTION INTRAVENOUS at 09:04

## 2024-04-17 RX ADMIN — ASPIRIN 325 MG ORAL TABLET 325 MG: 325 PILL ORAL at 09:04

## 2024-04-17 NOTE — FIRST PROVIDER EVALUATION
Medical screening examination initiated.  I have conducted a focused provider triage encounter, findings are as follows:    Brief history of present illness:  Endorses tingling to the left upper and lower extremity for the past week.  She states that she felt like her left arm was a little bit heavier last night but that has resolved.  She continues to have the tingling sensation that she feels like ants are crawling on her leg.  Denies weakness.    Vitals:    04/17/24 1757   BP: (!) 144/76   Pulse: (!) 53   Resp: 18   Temp: 98.2 °F (36.8 °C)   SpO2: 100%   Weight: 68 kg (150 lb)       Pertinent physical exam:  No weakness, GCS of 15, full sensation    Brief workup plan:  CT head with labs    Preliminary workup initiated; this workup will be continued and followed by the physician or advanced practice provider that is assigned to the patient when roomed.

## 2024-04-17 NOTE — Clinical Note
"Racquel"Masha Orozco was seen and treated in our emergency department on 4/17/2024.  She may return to work on 04/22/2024.       If you have any questions or concerns, please don't hesitate to call.      Zeke Mercedes RN    "

## 2024-04-18 ENCOUNTER — TELEPHONE (OUTPATIENT)
Dept: NEUROLOGY | Facility: CLINIC | Age: 49
End: 2024-04-18
Payer: COMMERCIAL

## 2024-04-18 VITALS
WEIGHT: 150 LBS | OXYGEN SATURATION: 95 % | RESPIRATION RATE: 21 BRPM | SYSTOLIC BLOOD PRESSURE: 175 MMHG | DIASTOLIC BLOOD PRESSURE: 88 MMHG | BODY MASS INDEX: 24.96 KG/M2 | HEART RATE: 52 BPM | TEMPERATURE: 98 F

## 2024-04-18 LAB
OHS QRS DURATION: 78 MS
OHS QRS DURATION: 80 MS
OHS QTC CALCULATION: 386 MS
OHS QTC CALCULATION: 396 MS

## 2024-04-18 NOTE — ED PROVIDER NOTES
Encounter Date: 4/17/2024       History     Chief Complaint   Patient presents with    Numbness     Pt reports to ED for numbness and tingling to left arms and left leg for about a week. Pt states left arm went more heavy than normal on yesterday. Denies  changes. Endorses loss of balance on occasionally.       47yo F presents to ED complaining of proximally 7 day history of left-sided upper extremity, left lower extremity pins and needles sensation.    Describes a skin crawling, mild pins and needles sensation beginning left-hand eventually entire left arm.  There is sometimes associated numbness.  No burning discomfort.  Similar symptom to her left foot, eventually involves her entire left leg.  Symptoms may lasts a few moments, sometimes longer.  She denies any swelling, no associated skin changes. Denies pain.  No associated hand or leg weakness. She denies history of previous cervical spine or lumbar spine issues.  No recent illness.  No fever.  No neck pain or stiffness.  No reported photophobia.  No associated headache.  No history of CVA.  No arm or leg weakness, slurred speech, facial droop, unsteady gait, aphasia.  States sometimes she feels a bit off balance when she turns while in the shower, but otherwise denies any associated dizziness, lightheadedness, feeling off balance associated with her left upper and lower extremity complaints.  No associated visual disturbance. No chest pain.  No shortness of breath.  No history of autoimmune disease.  No personal or family history of MS.    Mother with history of cerebral aneurysm    PMH:  Generalized anxiety disorder   Atrial premature contractions   Heart palpitations   Essential hypertension   MVP  Dyslipidemia   AUB  Dysmenorrhea  LORENZO  Nephrolithiasis    LHC/renal angio 8/15/23:  Impression:  CP/HTN emerg with elev trop  Normal cors/LV fxn  Normal renal arteries bilaterally  R rad vasband for hemostasis    TTE 8/15/23:   ·  Left Ventricle: The left  ventricle is normal in size. Normal wall thickness. There is concentric remodeling. Normal wall motion. There is normal systolic function with a visually estimated ejection fraction of 65 - 70%. There is normal diastolic function.  ·  Right Ventricle: Normal right ventricular cavity size. Wall thickness is normal. Right ventricle wall motion  is normal. Systolic function is normal.  ·  Tricuspid Valve: There is mild regurgitation.  ·  Pulmonary Artery: The estimated pulmonary artery systolic pressure is 34 mmHg.  ·  Pericardium: Trivial pericardial effusion present. No indication of cardiac tamponade.      Review of patient's allergies indicates:  No Known Allergies  Past Medical History:   Diagnosis Date    Herpes simplex without mention of complication     Hypertension     MVP (mitral valve prolapse)      Past Surgical History:   Procedure Laterality Date    ANGIOGRAM, RENAL ARTERIES, BILATERAL Bilateral 8/15/2023    Procedure: Angiogram, Renal Arteries, Bilateral;  Surgeon: Zane Jacob MD;  Location: [facility] CATH LAB;  Service: Cardiology;  Laterality: Bilateral;    DILATION AND CURETTAGE OF UTERUS      LEFT HEART CATHETERIZATION Left 8/15/2023    Procedure: Left heart cath;  Surgeon: Zane Jacob MD;  Location: [facility] CATH LAB;  Service: Cardiology;  Laterality: Left;  R rad access, not before 9am    VAGINAL DELIVERY      x4 normal     Family History   Problem Relation Name Age of Onset    Diabetes Other      Hypertension Other      Cancer Neg Hx       Social History     Tobacco Use    Smoking status: Never    Smokeless tobacco: Never   Substance Use Topics    Alcohol use: Yes     Comment: Occassionally    Drug use: No     Review of Systems   Constitutional:  Negative for chills and fever.   Eyes:  Negative for photophobia and visual disturbance.   Respiratory:  Negative for shortness of breath.    Cardiovascular:  Negative for chest pain and leg swelling.   Skin:  Negative for rash and wound.    Neurological:  Positive for numbness. Negative for seizures, syncope, facial asymmetry, speech difficulty, weakness, light-headedness and headaches.       Physical Exam     Initial Vitals [04/17/24 1757]   BP Pulse Resp Temp SpO2   (!) 144/76 (!) 53 18 98.2 °F (36.8 °C) 100 %      MAP       --         Physical Exam    Nursing note and vitals reviewed.  Constitutional: She appears well-developed and well-nourished. She is not diaphoretic. No distress.   Well-appearing nontoxic.  Speaking in full sentences without pause or difficulty.   HENT:   Head: Normocephalic and atraumatic.   Face symmetric, tongue midline   Eyes: EOM are normal. Pupils are equal, round, and reactive to light.   No nystagmus   Neck: Neck supple.   No carotid bruit   Normal range of motion.  Cardiovascular:  Normal rate and regular rhythm.           No unilateral leg swelling or calf tenderness, no pretibial edema.  1+ DP bilaterally.  Normal sinus rhythm.   Pulmonary/Chest: No respiratory distress.   Musculoskeletal:         General: No tenderness. Normal range of motion.      Cervical back: Normal range of motion and neck supple.     Neurological: She is alert and oriented to person, place, and time. She has normal strength. GCS score is 15. GCS eye subscore is 4. GCS verbal subscore is 5. GCS motor subscore is 6.   Grossly symmetric upper and lower extremity strength.  Negative Romberg.  No pronator drift.  Normal, symmetric finger-to-nose.  Sensation intact bilaterally, symmetric.   Skin: Skin is warm.   Psychiatric: Thought content normal.   Mildly anxious         ED Course   Procedures  Labs Reviewed   CBC W/ AUTO DIFFERENTIAL - Abnormal; Notable for the following components:       Result Value    MCHC 31.4 (*)     Platelets 105 (*)     Platelet Estimate Clumped (*)     All other components within normal limits   COMPREHENSIVE METABOLIC PANEL - Abnormal; Notable for the following components:    BUN 27 (*)     Total Protein 8.6 (*)      Alkaline Phosphatase 156 (*)     eGFR 50 (*)     All other components within normal limits   LIPID PANEL - Abnormal; Notable for the following components:    LDL Cholesterol 55.2 (*)     All other components within normal limits   PROTIME-INR   TSH   MAGNESIUM   URINALYSIS, REFLEX TO URINE CULTURE    Narrative:     Specimen Source->Urine   MAGNESIUM   SARS-COV-2 RDRP GENE     EKG Readings: (Independently Interpreted)   Sinus bradycardia, ventricular rate 52 beats per minute.  Normal PA, normal QT, normal QRS duration.  No convincing right axis deviation.  No obvious ST elevation.  Mild artifact.  Flattening versus inversion T-wave lead 3.  Similar nonspecific ST segment change to precordial leads V2 through V5.  EKG appears grossly similar previous dated 08/15/2023.       Imaging Results              CTA Head and Neck (xpd) (Final result)  Result time 04/17/24 22:39:42      Final result by Rocio Hawkins MD (04/17/24 22:39:42)                   Impression:      1. No acute intracranial abnormalities identified.  Clinical concern for acute infarction, future MRI follow-up may be obtained.  2. CTA head and neck with no evidence of high-grade stenosis, large vessel occlusion, or dissection.      Electronically signed by: Rocio Hawkins MD  Date:    04/17/2024  Time:    22:39               Narrative:    EXAMINATION:  CTA HEAD AND NECK (XPD)    CLINICAL HISTORY:  Transient ischemic attack (TIA);    TECHNIQUE:  Non contrast low dose axial images were obtained through the head.  CT angiogram was performed from the level of the juan to the top of the head following the IV administration of 75mL of Omnipaque 350.   Sagittal and coronal reconstructions and maximum intensity projection reconstructions were performed. Arterial stenosis percentages are based on NASCET measurement criteria.  Rapid AI screening was performed.    COMPARISON:  Noncontrast CT head from the same date at 18:20.    FINDINGS:  No evidence of  acute/recent major vascular distribution cerebral infarction, intraparenchymal hemorrhage, or intra-axial space occupying lesion. The ventricular system is normal in size and configuration with no evidence of hydrocephalus. No effacement of the skull-base cisterns. No abnormal extra-axial fluid collections or blood products. Visualized paranasal sinuses and mastoid air cells are clear. The calvarium shows no significant abnormality.    CTA Neck: The origins of the right brachiocephalic, left common carotid and left subclavian arteries from the arch are within normal limits.  The origins of the vertebral arteries are within normal limits.  The vertebral arteries are patent without evidence for focal stenosis or occlusion.   The bilateral common carotid arteries and internal carotid arteries are patent without evidence for focal stenosis or occlusion.  No evidence of carotid or vertebral artery dissection.    Anterior circulation: The bilateral distal cervical, petrous, cavernous, and supraclinoid ICAs are patent without significant stenosis or aneurysm.  The anterior and middle cerebral arteries are patent without significant stenosis, occlusion, or aneurysm.    Posterior circulation: Dominant left vertebral artery.  Distal vertebral arteries, basilar artery and posterior cerebral arteries are patent without significant focal stenosis, occlusion, or aneurysm.    Airways: Unremarkable.    Glands/Nodes: The parotid, submandibular, and thyroid glands are unremarkable.    Spine: No acute cervical spine abnormalities are identified.  Minor degenerative changes and spurring are seen.    Lungs: Visualized lung apices are clear.                                       X-Ray Chest 1 View (Final result)  Result time 04/17/24 21:17:01      Final result by Rcoio Hawkins MD (04/17/24 21:17:01)                   Impression:      No acute cardiopulmonary process identified.      Electronically signed by: Rocio Hawkins  MD  Date:    04/17/2024  Time:    21:17               Narrative:    EXAMINATION:  XR CHEST 1 VIEW    CLINICAL HISTORY:  Anesthesia of skin    TECHNIQUE:  Single frontal view of the chest was performed.    COMPARISON:  August 2023.    FINDINGS:  Cardiac silhouette is normal in size.  Lungs are symmetrically expanded.  No evidence of focal consolidative process, pneumothorax, or significant pleural effusion.  No acute osseous abnormality identified.                                       CT Head Without Contrast (Final result)  Result time 04/17/24 18:27:53      Final result by Rocio Hawkins MD (04/17/24 18:27:53)                   Impression:      No acute intracranial abnormalities identified.      Electronically signed by: Rocio Hawkins MD  Date:    04/17/2024  Time:    18:27               Narrative:    EXAMINATION:  CT HEAD WITHOUT CONTRAST    CLINICAL HISTORY:  Neuro deficit, acute, stroke suspected;    TECHNIQUE:  Low dose axial images were obtained through the head.  Coronal and sagittal reformations were also performed. Contrast was not administered.    COMPARISON:  CT head from August 2023.    FINDINGS:  No evidence of acute/recent major vascular distribution cerebral infarction, intraparenchymal hemorrhage, or intra-axial space occupying lesion. The ventricular system is normal in size and configuration with no evidence of hydrocephalus. No effacement of the skull-base cisterns. No abnormal extra-axial fluid collections or blood products. Visualized paranasal sinuses and mastoid air cells are clear. The calvarium shows no significant abnormality.                                    X-Rays:   Independently Interpreted Readings:   Chest X-Ray: Personal interpretation:  Borderline cardiomegaly, no convincing effusion, no obvious pneumothorax, no dense lobar consolidation.     Medications   lactated ringers bolus 1,000 mL (0 mLs Intravenous Stopped 4/17/24 2225)   aspirin tablet 325 mg (325 mg Oral Given  "4/17/24 2148)   iohexoL (OMNIPAQUE 350) injection 75 mL (75 mLs Intravenous Given 4/17/24 2205)     Medical Decision Making  Differential diagnosis:  TIA, radiculopathy, CVA, anxiety, neuropathy, vitamin deficiency    Amount and/or Complexity of Data Reviewed  Labs: ordered. Decision-making details documented in ED Course.     Details: Has had decreased GFR off and on x 6mo, has been decreased over the past month. BUN/creat ratio >20:1. Will give some fluids. No reported oliguria.  No hydronephrosis on repeat 03/04/2024 urogram.  Radiology: ordered and independent interpretation performed. Decision-making details documented in ED Course.  ECG/medicine tests: ordered and independent interpretation performed. Decision-making details documented in ED Course.  Discussion of management or test interpretation with external provider(s): ABCD2 score 1 or 3, either way low risk (unclear how to answer the "duration" question).    Risk  OTC drugs.  Prescription drug management.               ED Course as of 04/18/24 0510   Wed Apr 17, 2024 2130 Spoke with vascular neurology on-call; after discussion, advised vessel imaging with CTA head/neck, if normal ok for f/u as outpatient with monotherapy antiplatelet given low risk ABCD2 score.  [SM]   8057 Thrombocytopenia, clumped platelet estimate; unsure if clinically significant thrombocytopenia.     Workup otherwise grossly unremarkable.  No focal deficits on exam.  No convincing evidence of LVO.  No persistent symptoms, symptoms has been intermittent since onset times one-week.  Strongly encouraged prompt follow-up with Neurology for re-evaluation.  I do think she is safe for discharge and follow up in the outpatient setting.  Will start on monotherapy with aspirin.  She denies history of GI bleeding.  Long discussion concerning need for prompt follow-up.  Advised more fluids given decreased GFR, somewhat of a prerenal picture.  Advised discussion with PCP concerning her GFR, " elevated blood pressure.  Red flags and return precautions discussed with patient.  She feels comfortable plan. [SM]      ED Course User Index  [SM] Darren Barrios PA-C                             Clinical Impression:  Final diagnoses:  [R20.0] Left sided numbness  [R94.4] Decreased GFR  [D69.6] Thrombocytopenia  [R20.2] Paresthesia of left upper and lower extremity (Primary)          ED Disposition Condition    Discharge Stable          ED Prescriptions       Medication Sig Dispense Start Date End Date Auth. Provider    aspirin 325 MG tablet Take 1 tablet (325 mg total) by mouth once daily. 30 tablet 4/17/2024 5/17/2024 Darren Barrios PA-C          Follow-up Information       Follow up With Specialties Details Why Contact Info    [facility] NEUROLOGY Neurology Schedule an appointment as soon as possible for a visit  For reevaluation [address] [facility] [place] [id number] [phone]    Yony Watt MD Family Medicine Schedule an appointment as soon as possible for a visit  For reevaluation of kidney function [address] [facility] [address]  [phone]               Darren Barrios PA-C  04/18/24 0513

## 2024-04-18 NOTE — ED NOTES
Patient identifiers verified and correct for   C/C: Pt reports numbness and tingling to left arms and left leg for about a week.   APPEARANCE: awake and alert in NAD.  SKIN: warm, dry and intact. No breakdown or bruising.  MUSCULOSKELETAL: Patient moving all extremities spontaneously, no obvious swelling or deformities noted. Ambulates independently.  RESPIRATORY: Denies shortness of breath.Respirations unlabored.   CARDIAC: Denies CP,  distal pulses; no peripheral edema  ABDOMEN: denies abdominal pain and n/v/d   : voids spontaneously, denies difficulty  Neurologic: AAO x 4; follows commands equal strength in all extremities; denies numbness/tingling. Denies dizziness

## 2024-04-18 NOTE — TELEPHONE ENCOUNTER
Spoke with patient about scheduling Neurology appt at the [facility] with Dr Candy Glynn 4/23/24. Patient verbalized understanding.

## 2024-04-18 NOTE — DISCHARGE INSTRUCTIONS
Please follow-up with a local neurologist using information provided.  Begin taking aspirin daily.  Make sure you are drinking plenty of fluids.  Contact your primary care provider for follow-up and re-evaluation of current complaints, for re-evaluation of decreased kidney function.    Immediately return to this ED if your symptoms worsen, if you develop any one-sided weakness, if you begin with severe headache, if unable to walk or bear weight, if you begin with severe dizziness and feeling off balance, if any other problems occur.

## 2024-04-23 ENCOUNTER — OFFICE VISIT (OUTPATIENT)
Dept: NEUROLOGY | Facility: CLINIC | Age: 49
End: 2024-04-23
Payer: COMMERCIAL

## 2024-04-23 ENCOUNTER — LAB VISIT (OUTPATIENT)
Dept: LAB | Facility: HOSPITAL | Age: 49
End: 2024-04-23
Attending: STUDENT IN AN ORGANIZED HEALTH CARE EDUCATION/TRAINING PROGRAM
Payer: COMMERCIAL

## 2024-04-23 VITALS
WEIGHT: 147.94 LBS | DIASTOLIC BLOOD PRESSURE: 56 MMHG | HEART RATE: 55 BPM | SYSTOLIC BLOOD PRESSURE: 115 MMHG | HEIGHT: 65 IN | BODY MASS INDEX: 24.65 KG/M2

## 2024-04-23 DIAGNOSIS — F41.1 GENERALIZED ANXIETY DISORDER: ICD-10-CM

## 2024-04-23 DIAGNOSIS — R20.2 PARESTHESIA: Primary | ICD-10-CM

## 2024-04-23 DIAGNOSIS — R29.818 FOCAL NEUROLOGICAL DEFICIT: ICD-10-CM

## 2024-04-23 DIAGNOSIS — R73.03 PRE-DIABETES: ICD-10-CM

## 2024-04-23 DIAGNOSIS — I10 PRIMARY HYPERTENSION: ICD-10-CM

## 2024-04-23 DIAGNOSIS — E78.5 HYPERLIPIDEMIA, UNSPECIFIED HYPERLIPIDEMIA TYPE: ICD-10-CM

## 2024-04-23 LAB
FOLATE SERPL-MCNC: 10.8 NG/ML (ref 4–24)
HCYS SERPL-SCNC: 15.9 UMOL/L (ref 4–15.5)
VIT B12 SERPL-MCNC: 491 PG/ML (ref 210–950)

## 2024-04-23 PROCEDURE — 3008F BODY MASS INDEX DOCD: CPT | Mod: CPTII,S$GLB,, | Performed by: STUDENT IN AN ORGANIZED HEALTH CARE EDUCATION/TRAINING PROGRAM

## 2024-04-23 PROCEDURE — 84207 ASSAY OF VITAMIN B-6: CPT | Performed by: STUDENT IN AN ORGANIZED HEALTH CARE EDUCATION/TRAINING PROGRAM

## 2024-04-23 PROCEDURE — 82607 VITAMIN B-12: CPT | Performed by: STUDENT IN AN ORGANIZED HEALTH CARE EDUCATION/TRAINING PROGRAM

## 2024-04-23 PROCEDURE — 84446 ASSAY OF VITAMIN E: CPT | Performed by: STUDENT IN AN ORGANIZED HEALTH CARE EDUCATION/TRAINING PROGRAM

## 2024-04-23 PROCEDURE — 82746 ASSAY OF FOLIC ACID SERUM: CPT | Performed by: STUDENT IN AN ORGANIZED HEALTH CARE EDUCATION/TRAINING PROGRAM

## 2024-04-23 PROCEDURE — 36415 COLL VENOUS BLD VENIPUNCTURE: CPT | Performed by: STUDENT IN AN ORGANIZED HEALTH CARE EDUCATION/TRAINING PROGRAM

## 2024-04-23 PROCEDURE — 99204 OFFICE O/P NEW MOD 45 MIN: CPT | Mod: S$GLB,,, | Performed by: STUDENT IN AN ORGANIZED HEALTH CARE EDUCATION/TRAINING PROGRAM

## 2024-04-23 PROCEDURE — 4010F ACE/ARB THERAPY RXD/TAKEN: CPT | Mod: CPTII,S$GLB,, | Performed by: STUDENT IN AN ORGANIZED HEALTH CARE EDUCATION/TRAINING PROGRAM

## 2024-04-23 PROCEDURE — 1159F MED LIST DOCD IN RCRD: CPT | Mod: CPTII,S$GLB,, | Performed by: STUDENT IN AN ORGANIZED HEALTH CARE EDUCATION/TRAINING PROGRAM

## 2024-04-23 PROCEDURE — 3074F SYST BP LT 130 MM HG: CPT | Mod: CPTII,S$GLB,, | Performed by: STUDENT IN AN ORGANIZED HEALTH CARE EDUCATION/TRAINING PROGRAM

## 2024-04-23 PROCEDURE — 84425 ASSAY OF VITAMIN B-1: CPT | Performed by: STUDENT IN AN ORGANIZED HEALTH CARE EDUCATION/TRAINING PROGRAM

## 2024-04-23 PROCEDURE — 83090 ASSAY OF HOMOCYSTEINE: CPT | Performed by: STUDENT IN AN ORGANIZED HEALTH CARE EDUCATION/TRAINING PROGRAM

## 2024-04-23 PROCEDURE — 99999 PR PBB SHADOW E&M-EST. PATIENT-LVL III: CPT | Mod: PBBFAC,,, | Performed by: STUDENT IN AN ORGANIZED HEALTH CARE EDUCATION/TRAINING PROGRAM

## 2024-04-23 PROCEDURE — 3078F DIAST BP <80 MM HG: CPT | Mod: CPTII,S$GLB,, | Performed by: STUDENT IN AN ORGANIZED HEALTH CARE EDUCATION/TRAINING PROGRAM

## 2024-04-23 NOTE — ASSESSMENT & PLAN NOTE
- target < 130/80 mmHg  - Continue home medications  - F/u PCP for risk factor modification monitoring  - Counseled on DASH diet; exercise and lifestyle modifications

## 2024-04-23 NOTE — ASSESSMENT & PLAN NOTE
- target LDL < 70   - Continue atorvastatin  - F/u PCP for risk factor modification monitoring  -  on DASH diet; exercise and lifestyle modifications

## 2024-04-23 NOTE — ASSESSMENT & PLAN NOTE
- euglycemic goals   - F/u PCP for risk factor modification monitoring /  on diabetic diet; exercise and lifestyle modifications

## 2024-04-23 NOTE — PROGRESS NOTES
Patient Name: Racquel Orozco  MRN: [id number]    CC: Paresthesia     HPI: Racquel Orozco is a 48 y.o. female with medical history of HTN, HLD, pre-DM, menopausal presenting to neurology clinic for evaluation and management of paresthesia.     Reported of symptoms of paresthesia in LUE x 1 week / intermittent migratory pattern to left LE and recent similar pattern in Right hand (alternative sensations of pins and needles > numbness). Daily basis - with no clear triggers, no associated symptoms of allodynia. Otherwise negative concerns of focal motor weakness; other sensory deficits / ANS dysfunction / cranial nerve deficits / gait imbalance or dis coordination. Negative for any migraines / seizures / strokes / or any similar events in the past     Denied any history of DM / uncontrolled HLD / metabolic syndrome / abnormal thyroid function / inflammatory neuropathies - GBS, CIDP / recent systemic illness / infectious etiologies - HIV, hepatitis C  / systemic lupus erythematosus / Sjogrens syndrome / tumor - paraneoplastic syndromes / hereditary sensory and autonomic neuropathies.     Recent ED evaluation - CT/CTA head and neck unrevealing    ROS:   Review of Systems   Constitutional: Negative for malaise/fatigue. Negative for weight loss.   Eyes: Negative for blurred vision and double vision.   Respiratory: Cardiovascular: Negative.   Gastrointestinal: Negative for frequency. Negative for urgency.   Musculoskeletal: Negative for joint pain. Negative for myalgias and falls.   Neurological: Negative for dizziness and tremors. Negative for focal weakness and seizures.   Psychiatric/Behavioral: Negative for depression and hallucinations. The patient is not nervous/anxious.    Past Medical History  Past Medical History:   Diagnosis Date    Herpes simplex without mention of complication     Hypertension     MVP (mitral valve prolapse)        Medications    Current Outpatient Medications:     aspirin 325 MG tablet, Take 1 tablet  (325 mg total) by mouth once daily., Disp: 30 tablet, Rfl: 0    atorvastatin (LIPITOR) 20 MG tablet, Take 1 tablet (20 mg total) by mouth every evening., Disp: 90 tablet, Rfl: 3    bisoprolol (ZEBETA) 5 MG tablet, Take 5 mg by mouth once daily., Disp: , Rfl:     hydroCHLOROthiazide (HYDRODIURIL) 25 MG tablet, Take 1 tablet (25 mg total) by mouth once daily., Disp: 90 tablet, Rfl: 3    HYDROcodone-acetaminophen (NORCO) 5-325 mg per tablet, Take 1 tablet by mouth every 6 (six) hours as needed for Pain., Disp: 12 tablet, Rfl: 0    losartan (COZAAR) 100 MG tablet, Take 1 tablet (100 mg total) by mouth once daily., Disp: 90 tablet, Rfl: 3    multivitamin (THERAGRAN) per tablet, Take 1 tablet by mouth once daily., Disp: , Rfl:     paroxetine (PAXIL) 10 MG tablet, Take 10 mg by mouth every morning., Disp: , Rfl:     spironolactone (ALDACTONE) 25 MG tablet, Take 1 tablet (25 mg total) by mouth once daily., Disp: 90 tablet, Rfl: 3    Allergies  Review of patient's allergies indicates:  No Known Allergies    Social History  Social History     Socioeconomic History    Marital status: Single   Tobacco Use    Smoking status: Never    Smokeless tobacco: Never   Substance and Sexual Activity    Alcohol use: Yes     Comment: Occassionally    Drug use: No    Sexual activity: Yes     Partners: Male     Birth control/protection: None     Social Determinants of Health     Financial Resource Strain: High Risk (3/4/2024)    Overall Financial Resource Strain (CARDIA)     Difficulty of Paying Living Expenses: Hard   Food Insecurity: Food Insecurity Present (3/4/2024)    Hunger Vital Sign     Worried About Running Out of Food in the Last Year: Often true     Ran Out of Food in the Last Year: Sometimes true   Transportation Needs: No Transportation Needs (3/4/2024)    PRAPARE - Transportation     Lack of Transportation (Medical): No     Lack of Transportation (Non-Medical): No   Physical Activity: Insufficiently Active (3/4/2024)    Exercise  "Vital Sign     Days of Exercise per Week: 1 day     Minutes of Exercise per Session: 10 min   Stress: No Stress Concern Present (3/4/2024)    [employer] [place] [employer] - Occupational Stress Questionnaire     Feeling of Stress : Only a little   Social Connections: Unknown (3/4/2024)    Social Connection and Isolation Panel [NHANES]     Frequency of Communication with Friends and Family: Twice a week     Frequency of Social Gatherings with Friends and Family: Once a week     Active Member of Clubs or Organizations: Yes     Attends Club or Organization Meetings: More than 4 times per year     Marital Status:    Housing Stability: High Risk (3/4/2024)    Housing Stability Vital Sign     Unable to Pay for Housing in the Last Year: Yes     Number of Places Lived in the Last Year: 1     Unstable Housing in the Last Year: No       Family History  Family History   Problem Relation Name Age of Onset    Diabetes Other      Hypertension Other      Cancer Neg Hx         Physical Exam  BP (!) 115/56 (BP Location: Left arm, Patient Position: Sitting, BP Method: Large (Automatic))   Pulse (!) 55   Ht 5' 5" (1.651 m)   Wt 67.1 kg (147 lb 14.9 oz)   BMI 24.62 kg/m²     General appearance: Well-developed, well-groomed.     Neurologic Exam: The patient is awake, alert and oriented. Language is fluent. Attention span and concentration are normal.     Cranial nerves:  Visual fields are full to confrontation.  Ocular motility is full in all cardinal positions of gaze. Facial sensation is normal to touch.  Facial activation is symmetric. Hearing is normal bilaterally.  Shoulder elevation is symmetric and full strength bilaterally.  Neck range of motion is normal.     Motor examination of all extremities demonstrates normal bulk and tone in all four limbs. There are no atrophy or fasciculations. Strength is 5/5 in the upper and lower extremities bilaterally without pronator drift.     Sensory examination is normal " to touch, vibration and proprioception in the upper and lower extremities bilaterally. Romberg is negative.    Deep tendon reflexes are 2+ and symmetric in the upper and lower extremities bilaterally. Toes are mute bilaterally.     Gait: Normal  casual gait.    Coordination: Finger to nose is normal in both upper extremities.     Lab and Test Results    WBC   Date Value Ref Range Status   04/17/2024 7.02 3.90 - 12.70 K/uL Final   02/27/2024 6.91 3.90 - 12.70 K/uL Final   08/14/2023 7.31 3.90 - 12.70 K/uL Final     Hemoglobin   Date Value Ref Range Status   04/17/2024 12.5 12.0 - 16.0 g/dL Final   02/27/2024 12.3 12.0 - 16.0 g/dL Final   08/14/2023 13.2 12.0 - 16.0 g/dL Final     Hematocrit   Date Value Ref Range Status   04/17/2024 39.8 37.0 - 48.5 % Final   02/27/2024 39.2 37.0 - 48.5 % Final   08/14/2023 41.2 37.0 - 48.5 % Final     Platelets   Date Value Ref Range Status   04/17/2024 105 (L) 150 - 450 K/uL Corrected     Comment:     Platelets are clumped on smear.Platelet count may be affected.   02/27/2024 172 150 - 450 K/uL Final   08/14/2023 199 150 - 450 K/uL Final     Glucose   Date Value Ref Range Status   04/17/2024 86 70 - 110 mg/dL Final   02/27/2024 78 70 - 110 mg/dL Final   11/06/2023 125 (H) 70 - 110 mg/dL Final     Sodium   Date Value Ref Range Status   04/17/2024 142 136 - 145 mmol/L Final   02/27/2024 143 136 - 145 mmol/L Final   11/06/2023 143 136 - 145 mmol/L Final     Potassium   Date Value Ref Range Status   04/17/2024 4.0 3.5 - 5.1 mmol/L Final   02/27/2024 4.4 3.5 - 5.1 mmol/L Final   11/06/2023 3.9 3.5 - 5.1 mmol/L Final     Chloride   Date Value Ref Range Status   04/17/2024 103 95 - 110 mmol/L Final   02/27/2024 106 95 - 110 mmol/L Final   11/06/2023 105 95 - 110 mmol/L Final     CO2   Date Value Ref Range Status   04/17/2024 28 23 - 29 mmol/L Final   02/27/2024 27 23 - 29 mmol/L Final   11/06/2023 27 23 - 29 mmol/L Final     BUN   Date Value Ref Range Status   04/17/2024 27 (H) 6 - 20  mg/dL Final   02/27/2024 33 (H) 6 - 20 mg/dL Final   11/06/2023 21 (H) 6 - 20 mg/dL Final     Creatinine   Date Value Ref Range Status   04/17/2024 1.3 0.5 - 1.4 mg/dL Final   02/27/2024 1.5 (H) 0.5 - 1.4 mg/dL Final   11/06/2023 1.1 0.5 - 1.4 mg/dL Final     Calcium   Date Value Ref Range Status   04/17/2024 10.0 8.7 - 10.5 mg/dL Final   02/27/2024 9.6 8.7 - 10.5 mg/dL Final   11/06/2023 10.4 8.7 - 10.5 mg/dL Final     Magnesium   Date Value Ref Range Status   04/17/2024 1.8 1.6 - 2.6 mg/dL Final     Alkaline Phosphatase   Date Value Ref Range Status   04/17/2024 156 (H) 55 - 135 U/L Final   02/27/2024 145 (H) 55 - 135 U/L Final   08/15/2023 158 (H) 55 - 135 U/L Final     ALT   Date Value Ref Range Status   04/17/2024 18 10 - 44 U/L Final   02/27/2024 17 10 - 44 U/L Final   08/15/2023 16 10 - 44 U/L Final     AST   Date Value Ref Range Status   04/17/2024 28 10 - 40 U/L Final   02/27/2024 25 10 - 40 U/L Final   08/15/2023 26 10 - 40 U/L Final     Images: Independently reviewed Other Tests    Assessment and Plan    Generalized anxiety disorder  - confounder for presentation     Paresthesia  48 y.o. female with medical history of HTN, HLD, pre-DM presenting to neurology clinic for evaluation and management of paresthesia.     Reported of symptoms of paresthesia in LUE x 1 week / intermittent migratory pattern to left LE and recent similar pattern in Right hand (alternative sensations of pins and needles > numbness). Daily basis - with no clear triggers, no associated symptoms of allodynia. Otherwise negative concerns of focal motor weakness; other sensory deficits / ANS dysfunction / cranial nerve deficits / gait imbalance or dis coordination.     Denied any history of DM / uncontrolled HLD / metabolic syndrome / abnormal thyroid function / inflammatory neuropathies - GBS, CIDP / recent systemic illness / infectious etiologies - HIV, hepatitis C  / systemic lupus erythematosus / Sjogrens syndrome / tumor -  paraneoplastic syndromes / hereditary sensory and autonomic neuropathies.     Recent ED evaluation - CT/CTA head and neck unrevealing     Recs:    Paresthesia is extremities - unclear on the etiological pattern / possible small fiber neuropathic pattern - Low suspicion for any infectious - inflammatory neuropathies;  paraproteinemia triggers.     Counseled on the DDx / evaluation   - MRI brain wo contrast to rule out any demyelinating central etiology   - neuropathy labs -  B1, B6, B12, MMA, Homocysteine, Vit E, Folate  - need no symptomatic management at the moment / shall re-evaluate   - Hold off on any interventional investigations at the moment - Quantitative Sensory Testing/Quantitative Sudomotor Axon Reflex Testing/ Skin Biopsy / Electromyography and Nerve-conduction Studies   - optimize on DM / HLD / anxiety management     HLD (hyperlipidemia)  - target LDL < 70   - Continue atorvastatin  - F/u PCP for risk factor modification monitoring  -  on DASH diet; exercise and lifestyle modifications     Pre-diabetes  - euglycemic goals   - F/u PCP for risk factor modification monitoring /  on diabetic diet; exercise and lifestyle modifications     HTN (hypertension)  - target < 130/80 mmHg  - Continue home medications  - F/u PCP for risk factor modification monitoring  - Counseled on DASH diet; exercise and lifestyle modifications                  Renard Gupta MD    General Neurology, [facility],   [address] [facility] [address], [address], [place] [address]    Vascular Neurology, Endovascular Neurosurgery,   [facility], [facility] [place] [address]

## 2024-04-23 NOTE — ASSESSMENT & PLAN NOTE
48 y.o. female with medical history of HTN, HLD, pre-DM presenting to neurology clinic for evaluation and management of paresthesia.     Reported of symptoms of paresthesia in LUE x 1 week / intermittent migratory pattern to left LE and recent similar pattern in Right hand (alternative sensations of pins and needles > numbness). Daily basis - with no clear triggers, no associated symptoms of allodynia. Otherwise negative concerns of focal motor weakness; other sensory deficits / ANS dysfunction / cranial nerve deficits / gait imbalance or dis coordination.     Denied any history of DM / uncontrolled HLD / metabolic syndrome / abnormal thyroid function / inflammatory neuropathies - GBS, CIDP / recent systemic illness / infectious etiologies - HIV, hepatitis C  / systemic lupus erythematosus / Sjogrens syndrome / tumor - paraneoplastic syndromes / hereditary sensory and autonomic neuropathies.     Recent ED evaluation - CT/CTA head and neck unrevealing     Recs:    Paresthesia is extremities - unclear on the etiological pattern / possible small fiber neuropathic pattern - Low suspicion for any infectious - inflammatory neuropathies;  paraproteinemia triggers.     Counseled on the DDx / evaluation   - MRI brain wo contrast to rule out any demyelinating central etiology   - neuropathy labs -  B1, B6, B12, MMA, Homocysteine, Vit E, Folate  - need no symptomatic management at the moment / shall re-evaluate   - Hold off on any interventional investigations at the moment - Quantitative Sensory Testing/Quantitative Sudomotor Axon Reflex Testing/ Skin Biopsy / Electromyography and Nerve-conduction Studies   - optimize on DM / HLD / anxiety management

## 2024-04-26 LAB
A-TOCOPHEROL VIT E SERPL-MCNC: 1203 UG/DL (ref 500–1800)
PYRIDOXAL SERPL-MCNC: 25 UG/L (ref 5–50)
VIT B1 BLD-MCNC: 58 UG/L (ref 38–122)

## 2024-05-09 ENCOUNTER — PROCEDURE VISIT (OUTPATIENT)
Dept: UROLOGY | Facility: CLINIC | Age: 49
End: 2024-05-09
Payer: COMMERCIAL

## 2024-05-09 VITALS — WEIGHT: 149.38 LBS | BODY MASS INDEX: 24.86 KG/M2

## 2024-05-09 DIAGNOSIS — R31.0 GROSS HEMATURIA: ICD-10-CM

## 2024-05-09 DIAGNOSIS — N20.0 NEPHROLITHIASIS: ICD-10-CM

## 2024-05-09 PROCEDURE — 52000 CYSTOURETHROSCOPY: CPT | Mod: S$GLB,,, | Performed by: UROLOGY

## 2024-05-09 NOTE — PROCEDURES
Cystoscopy    Date/Time: 5/9/2024 11:20 AM    Performed by: Kelli Del Toro MD  Authorized by: Kelli Del Toro MD    Consent Done?:  Yes (Written)  Timeout: prior to procedure the correct patient, procedure, and site was verified    Prep: patient was prepped and draped in usual sterile fashion    Anesthesia:  Lidocaine jelly  Indications: hematuria    Position:  Dorsal lithotomy  Anesthesia:  Lidocaine jelly  Patient sedated?: No    Preparation: Patient was prepped and draped in usual sterile fashion    Scope type:  Flexible cystoscope  Stent inserted: No    Stent removed: No    External exam normal: Yes    Digital exam performed: No    Urethra normal: Yes    Bladder neck normal: Yes    Bladder normal: Yes     patient tolerated the procedure well with no immediate complications  Comments:        Stone - 100% CaOx mono    CT uro 3/24 - R UVJ stone no longer visible (passed). Punctate L renal stones. Small band shape hypo[employer]ancement [employer] kidney - poss scar vs infarct.    Cysto - normal    Will check KUB in 6mths with f/u visit.

## 2024-08-27 RX ORDER — HYDROCHLOROTHIAZIDE 25 MG/1
25 TABLET ORAL DAILY
Qty: 90 TABLET | Refills: 3 | Status: SHIPPED | OUTPATIENT
Start: 2024-08-27 | End: 2025-08-27

## 2024-08-27 RX ORDER — ASPIRIN 325 MG
325 TABLET ORAL DAILY
Qty: 30 TABLET | Refills: 0 | OUTPATIENT
Start: 2024-08-27 | End: 2024-09-26

## 2024-08-27 RX ORDER — LOSARTAN POTASSIUM 100 MG/1
100 TABLET ORAL DAILY
Qty: 90 TABLET | Refills: 3 | Status: SHIPPED | OUTPATIENT
Start: 2024-08-27

## 2024-08-27 RX ORDER — SPIRONOLACTONE 25 MG/1
25 TABLET ORAL DAILY
Qty: 90 TABLET | Refills: 3 | Status: SHIPPED | OUTPATIENT
Start: 2024-08-27 | End: 2025-08-27

## 2024-08-27 RX ORDER — ATORVASTATIN CALCIUM 20 MG/1
20 TABLET, FILM COATED ORAL NIGHTLY
Qty: 90 TABLET | Refills: 3 | Status: SHIPPED | OUTPATIENT
Start: 2024-08-27

## 2024-08-27 NOTE — TELEPHONE ENCOUNTER
Patients states that [place] and is currently looking for a cardiologist out in [place] but that she needs a refill on her meds until she finds one. Please advise

## 2024-08-27 NOTE — TELEPHONE ENCOUNTER
----- Message from Mick Bruno sent at 8/27/2024  2:05 PM CDT -----  Regarding: self  Type:  Patient Returning Call    Who Called:self    Who Left Message for Patient: Cady Self MA    Does the patient know what this is regarding?:refills    Would the patient rather a call back or a response via [facility]?callback    Best Call Back Number:[phone]    Additional Information:

## 2025-04-15 ENCOUNTER — HOSPITAL ENCOUNTER (EMERGENCY)
Facility: HOSPITAL | Age: 50
Discharge: HOME | End: 2025-04-15
Attending: EMERGENCY MEDICINE

## 2025-04-15 VITALS
DIASTOLIC BLOOD PRESSURE: 64 MMHG | WEIGHT: 150 LBS | OXYGEN SATURATION: 100 % | BODY MASS INDEX: 24.99 KG/M2 | TEMPERATURE: 98 F | HEART RATE: 53 BPM | SYSTOLIC BLOOD PRESSURE: 128 MMHG | RESPIRATION RATE: 18 BRPM | HEIGHT: 65 IN

## 2025-04-15 DIAGNOSIS — M54.12 CERVICAL RADICULOPATHY: Primary | ICD-10-CM

## 2025-04-15 DIAGNOSIS — N17.9 AKI (ACUTE KIDNEY INJURY): ICD-10-CM

## 2025-04-15 LAB
ABSOLUTE EOSINOPHIL (OHS): 0.16 K/UL
ABSOLUTE MONOCYTE (OHS): 0.61 K/UL (ref 0.3–1)
ABSOLUTE NEUTROPHIL COUNT (OHS): 3.75 K/UL (ref 1.8–7.7)
ALBUMIN SERPL BCP-MCNC: 4.9 G/DL (ref 3.5–5.2)
ALP SERPL-CCNC: 158 UNIT/L (ref 40–150)
ALT SERPL W/O P-5'-P-CCNC: 18 UNIT/L (ref 10–44)
ANION GAP (OHS): 16 MMOL/L (ref 8–16)
AST SERPL-CCNC: 32 UNIT/L (ref 11–45)
B-HCG UR QL: NEGATIVE
BASOPHILS # BLD AUTO: 0.03 K/UL
BASOPHILS NFR BLD AUTO: 0.4 %
BILIRUB SERPL-MCNC: 0.3 MG/DL (ref 0.1–1)
BILIRUB UR QL STRIP.AUTO: NEGATIVE
BUN SERPL-MCNC: 37 MG/DL (ref 6–20)
CALCIUM SERPL-MCNC: 10.2 MG/DL (ref 8.7–10.5)
CHLORIDE SERPL-SCNC: 102 MMOL/L (ref 95–110)
CLARITY UR: CLEAR
CO2 SERPL-SCNC: 25 MMOL/L (ref 23–29)
COLOR UR AUTO: YELLOW
CREAT SERPL-MCNC: 1.8 MG/DL (ref 0.5–1.4)
CTP QC/QA: YES
ERYTHROCYTE [DISTWIDTH] IN BLOOD BY AUTOMATED COUNT: 12.6 % (ref 11.5–14.5)
GFR SERPLBLD CREATININE-BSD FMLA CKD-EPI: 34 ML/MIN/1.73/M2
GLUCOSE SERPL-MCNC: 91 MG/DL (ref 70–110)
GLUCOSE UR QL STRIP: NEGATIVE
HCT VFR BLD AUTO: 40.9 % (ref 37–48.5)
HGB BLD-MCNC: 12.7 GM/DL (ref 12–16)
HGB UR QL STRIP: NEGATIVE
HOLD SPECIMEN: NORMAL
IMM GRANULOCYTES # BLD AUTO: 0.01 K/UL (ref 0–0.04)
IMM GRANULOCYTES NFR BLD AUTO: 0.1 % (ref 0–0.5)
KETONES UR QL STRIP: NEGATIVE
LEUKOCYTE ESTERASE UR QL STRIP: NEGATIVE
LYMPHOCYTES # BLD AUTO: 3.28 K/UL (ref 1–4.8)
MCH RBC QN AUTO: 28.6 PG (ref 27–31)
MCHC RBC AUTO-ENTMCNC: 31.1 G/DL (ref 32–36)
MCV RBC AUTO: 92 FL (ref 82–98)
NITRITE UR QL STRIP: NEGATIVE
NUCLEATED RBC (/100WBC) (OHS): 0 /100 WBC
PH UR STRIP: 6 [PH]
PLATELET # BLD AUTO: 200 K/UL (ref 150–450)
PMV BLD AUTO: 10.9 FL (ref 9.2–12.9)
POTASSIUM SERPL-SCNC: 4.2 MMOL/L (ref 3.5–5.1)
PROT SERPL-MCNC: 9.6 GM/DL (ref 6–8.4)
PROT UR QL STRIP: NEGATIVE
RBC # BLD AUTO: 4.44 M/UL (ref 4–5.4)
RELATIVE EOSINOPHIL (OHS): 2 %
RELATIVE LYMPHOCYTE (OHS): 41.8 % (ref 18–48)
RELATIVE MONOCYTE (OHS): 7.8 % (ref 4–15)
RELATIVE NEUTROPHIL (OHS): 47.9 % (ref 38–73)
SODIUM SERPL-SCNC: 143 MMOL/L (ref 136–145)
SP GR UR STRIP: 1.02
TSH SERPL-ACNC: 1.8 UIU/ML (ref 0.4–4)
UROBILINOGEN UR STRIP-ACNC: NEGATIVE EU/DL
WBC # BLD AUTO: 7.84 K/UL (ref 3.9–12.7)

## 2025-04-15 PROCEDURE — 99284 EMERGENCY DEPT VISIT MOD MDM: CPT | Mod: 25

## 2025-04-15 PROCEDURE — 81025 URINE PREGNANCY TEST: CPT | Performed by: NURSE PRACTITIONER

## 2025-04-15 PROCEDURE — 96372 THER/PROPH/DIAG INJ SC/IM: CPT | Performed by: NURSE PRACTITIONER

## 2025-04-15 PROCEDURE — 80053 COMPREHEN METABOLIC PANEL: CPT | Performed by: NURSE PRACTITIONER

## 2025-04-15 PROCEDURE — 84443 ASSAY THYROID STIM HORMONE: CPT | Performed by: NURSE PRACTITIONER

## 2025-04-15 PROCEDURE — 63600175 PHARM REV CODE 636 W HCPCS: Mod: JZ,TB | Performed by: NURSE PRACTITIONER

## 2025-04-15 PROCEDURE — 85025 COMPLETE CBC W/AUTO DIFF WBC: CPT | Performed by: NURSE PRACTITIONER

## 2025-04-15 PROCEDURE — 81003 URINALYSIS AUTO W/O SCOPE: CPT | Performed by: NURSE PRACTITIONER

## 2025-04-15 RX ORDER — PREDNISONE 20 MG/1
40 TABLET ORAL DAILY
Qty: 10 TABLET | Refills: 0 | Status: SHIPPED | OUTPATIENT
Start: 2025-04-15 | End: 2025-04-20

## 2025-04-15 RX ORDER — KETOROLAC TROMETHAMINE 30 MG/ML
15 INJECTION, SOLUTION INTRAMUSCULAR; INTRAVENOUS
Status: COMPLETED | OUTPATIENT
Start: 2025-04-15 | End: 2025-04-15

## 2025-04-15 RX ORDER — TIZANIDINE 2 MG/1
2 TABLET ORAL EVERY 6 HOURS PRN
Qty: 28 TABLET | Refills: 0 | Status: SHIPPED | OUTPATIENT
Start: 2025-04-15

## 2025-04-15 RX ORDER — NAPROXEN 500 MG/1
500 TABLET ORAL 2 TIMES DAILY WITH MEALS
Qty: 14 TABLET | Refills: 0 | Status: SHIPPED | OUTPATIENT
Start: 2025-04-15 | End: 2025-04-15 | Stop reason: CLARIF

## 2025-04-15 RX ADMIN — KETOROLAC TROMETHAMINE 15 MG: 30 INJECTION, SOLUTION INTRAMUSCULAR; INTRAVENOUS at 07:04

## 2025-04-16 NOTE — DISCHARGE INSTRUCTIONS
Follow up with your pcp in next few days for redraw on BUN/Creatinine for improvement in recent downturn after hydrating intensively.     You have been given a medication that causes drowsiness.  Do not operate motor vehicles, drink alcohol, or operate heavy machinery while taking this medication. Return to the Emergency Department for any worsening, change in condition, or any emergent concerns.

## 2025-04-16 NOTE — ED PROVIDER NOTES
Encounter Date: 4/15/2025       History     Chief Complaint   Patient presents with    Shoulder Pain     Pt presents to the ED with c/o right shoulder pain with radiation to hand x 1 month. Pt reports numbness and tingling to her right hand. Pt took tylenol 1 hr PTA without relief.      Chief complaint: Right shoulder pain     History of present illness: Patient is a 49-year-old female who reports greater than 1 month of right shoulder pain that is intermittent and feels like pressure.  She states that there are no alleviating factors it is worse when she is sitting in her car.  She states it radiates to her upper arm and down to her hands it is a shooting numbness.  States it is same throughout the entire day.  Denies any tingling bowel or bladder incontinence and all other complaints at this time.  Denies falls or injuries.  Tylenol helps occasionally.  Current severity pain is 10/10.    The history is provided by the patient. No  was used.     Review of patient's allergies indicates:  No Known Allergies  Past Medical History:   Diagnosis Date    Herpes simplex without mention of complication     Hypertension     MVP (mitral valve prolapse)      Past Surgical History:   Procedure Laterality Date    ANGIOGRAM, RENAL ARTERIES, BILATERAL Bilateral 8/15/2023    Procedure: Angiogram, Renal Arteries, Bilateral;  Surgeon: Zane Jacob MD;  Location: [facility] CATH LAB;  Service: Cardiology;  Laterality: Bilateral;    DILATION AND CURETTAGE OF UTERUS      LEFT HEART CATHETERIZATION Left 8/15/2023    Procedure: Left heart cath;  Surgeon: Zane Jacob MD;  Location: [facility] CATH LAB;  Service: Cardiology;  Laterality: Left;  R rad access, not before 9am    VAGINAL DELIVERY      x4 normal     Family History   Problem Relation Name Age of Onset    Diabetes Other      Hypertension Other      Cancer Neg Hx       Social History[1]  Review of Systems   Neurological:         Left arm paresthesias        Physical Exam     Initial Vitals [04/15/25 1741]   BP Pulse Resp Temp SpO2   124/60 (!) 52 18 97.6 °F (36.4 °C) 100 %      MAP       --         Physical Exam    Nursing note and vitals reviewed.  Constitutional: She appears well-developed and well-nourished.   HENT:   Head: Normocephalic and atraumatic.   Right Ear: External ear normal.   Left Ear: External ear normal.   Nose: Nose normal.   Eyes: Conjunctivae and EOM are normal. Pupils are equal, round, and reactive to light. Right eye exhibits no discharge. Left eye exhibits no discharge.   Neck:   Normal range of motion.  Abdominal: She exhibits no distension.   Musculoskeletal:         General: Normal range of motion.      Right shoulder: Normal.      Right upper arm: Normal.      Right elbow: Normal.      Cervical back: Normal range of motion.      Comments: Spine is without tenderness or stepoffs.     Neurological: She is alert and oriented to person, place, and time.   Skin: Skin is dry. Capillary refill takes less than 2 seconds.         ED Course   Procedures  Labs Reviewed   COMPREHENSIVE METABOLIC PANEL - Abnormal       Result Value    Sodium 143      Potassium 4.2      Chloride 102      CO2 25      Glucose 91      BUN 37 (*)     Creatinine 1.8 (*)     Calcium 10.2      Protein Total 9.6 (*)     Albumin 4.9      Bilirubin Total 0.3       (*)     AST 32      ALT 18      Anion Gap 16      eGFR 34 (*)     Narrative:     Specimen slightly hemolyzed.   CBC WITH DIFFERENTIAL - Abnormal    WBC 7.84      RBC 4.44      HGB 12.7      HCT 40.9      MCV 92      MCH 28.6      MCHC 31.1 (*)     RDW 12.6      Platelet Count 200      MPV 10.9      Nucleated RBC 0      Neut % 47.9      Lymph % 41.8      Mono % 7.8      Eos % 2.0      Basophil % 0.4      Imm Grans % 0.1      Neut # 3.75      Lymph # 3.28      Mono # 0.61      Eos # 0.16      Baso # 0.03      Imm Grans # 0.01     TSH - Normal    TSH 1.798     URINALYSIS, REFLEX TO URINE CULTURE - Normal     Color, UA Yellow      Appearance, UA Clear      pH, UA 6.0      Spec Grav UA 1.025      Protein, UA Negative      Glucose, UA Negative      Ketones, UA Negative      Bilirubin, UA Negative      Blood, UA Negative      Nitrites, UA Negative      Urobilinogen, UA Negative      Leukocyte Esterase, UA Negative     CBC W/ AUTO DIFFERENTIAL    Narrative:     The following orders were created for panel order CBC auto differential.  Procedure                               Abnormality         Status                     ---------                               -----------         ------                     CBC with Differential[[id number]]       Abnormal            Final result                 Please view results for these tests on the individual orders.   GREY TOP URINE HOLD    Extra Tube Hold for add-ons.     POCT URINE PREGNANCY    POC Preg Test, Ur Negative       Acceptable Yes            Imaging Results              X-Ray Cervical Spine AP And Lateral (Final result)  Result time 04/15/25 19:09:10      Final result by Rocio Hawkins MD (04/15/25 19:09:10)                   Impression:      No acute cervical spine abnormalities identified.      Electronically signed by: Rocio Hawkins MD  Date:    04/15/2025  Time:    19:09               Narrative:    EXAMINATION:  XR CERVICAL SPINE AP LATERAL    CLINICAL HISTORY:  Radiculopathy, cervical region    TECHNIQUE:  AP, lateral and open mouth views of the cervical spine were performed.    COMPARISON:  None.    FINDINGS:  No evidence of acute cervical spine fracture or subluxation.  Cervical spine alignment is within normal limits.  Odontoid process appears intact.  Intervertebral disc space narrowing with anterior degenerative spurring are seen throughout the cervical levels C3-4 through C6-7.  Surrounding soft tissues show no significant abnormalities.                                       Medications   ketorolac injection 15 mg (15 mg Intramuscular Given  4/15/25 1934)     Medical Decision Making  Patient is a 49-year-old female who reports greater than 1 month of right shoulder pain that is intermittent and feels like pressure.  She states that there are no alleviating factors it is worse when she is sitting in her car.  She states it radiates to her upper arm and down to her hands it is a shooting numbness.  States it is same throughout the entire day.  Denies any tingling bowel or bladder incontinence and all other complaints at this time.  Denies falls or injuries.  Tylenol helps occasionally.  Current severity pain is 10/10.    On physical exam the patient is afebrile nontoxic in no apparent distress vital signs are stable and reassuring.  She has full range of motion of the neck without difficulty or discomfort.  The spine is without tenderness or step-offs.   strength equal bilaterally and appropriate.  Distal P SM is intact to both arms.      Differential diagnosis includes cervical radiculopathy, CVA, Guillain-Simonton    Problems Addressed:  KURTIS (acute kidney injury): acute illness or injury     Details: Cr/BUN above usual baseline.  Recommend intensive hydration and prompt f/u with pcp for recheck and med adjustment.  Cervical radiculopathy: acute illness or injury     Details: Steroids and muscle relaxers prescribed.  Follow-up as directed.    Amount and/or Complexity of Data Reviewed  Labs: ordered. Decision-making details documented in ED Course.  Radiology: ordered. Decision-making details documented in ED Course.  Discussion of management or test interpretation with external provider(s): Return to the Emergency Department for any worsening, change in condition, or any emergent concerns.        Risk  Prescription drug management.  Diagnosis or treatment significantly limited by social determinants of health.               ED Course as of 04/15/25 2151   Tue Apr 15, 2025   1848 BP: 124/60 [VC]   1848 Temp: 97.6 °F (36.4 °C) [VC]   1848 Temp Source:  Oral [VC]   1848 Pulse(!): 52 [VC]   1848 Resp: 18 [VC]   1848 SpO2: 100 % [VC]   1904 hCG Qualitative, Urine: Negative [VC]   1928 Urinalysis, Reflex to Urine Culture  Normal ua. [VC]   1928 X-Ray Cervical Spine AP And Lateral  Intervertebral disc space narrowing with anterior degenerative spurring are seen throughout the cervical levels C3-4 through C6-7. [VC]   1941 CBC auto differential(!)  Normal cbc. [VC]   2021 Comprehensive metabolic panel(!) [VC]   2022 Comprehensive metabolic panel(!)  Kurtis present, likely due to dehydration.   [VC]   2022 ALP(!): 158  At patient's baseline. [VC]      ED Course User Index  [] Tyler Cheng DNP                           Clinical Impression:  Final diagnoses:  [M54.12] Cervical radiculopathy (Primary)  [N17.9] KURTIS (acute kidney injury)          ED Disposition Condition    Discharge Stable          ED Prescriptions       Medication Sig Dispense Start Date End Date Auth. Provider    naproxen (NAPROSYN) 500 MG tablet  (Status: Discontinued) Take 1 tablet (500 mg total) by mouth 2 (two) times daily with meals. 14 tablet 4/15/2025 4/15/2025 Tyler Cheng DNP    tiZANidine (ZANAFLEX) 2 MG tablet Take 1 tablet (2 mg total) by mouth every 6 (six) hours as needed. 28 tablet 4/15/2025 -- Tyler Cheng DNP    predniSONE (DELTASONE) 20 MG tablet Take 2 tablets (40 mg total) by mouth once daily. for 5 days 10 tablet 4/15/2025 4/20/2025 Tyler Cheng DNP          Follow-up Information       Follow up With Specialties Details Why Contact Info    Yony Watt MD Family Medicine Schedule an appointment as soon as possible for a visit   [address] [facility]  [place] [address] [phone]      Mayo Orozco MD Family Medicine, Wound Care   [address] [facility]  [place] [address] [phone]      Hilario Swenson MD Family Medicine   [address]  [place] [address] [phone]                   [1]   Social History  Tobacco Use    Smoking  status: Never    Smokeless tobacco: Never   Substance Use Topics    Alcohol use: Yes     Comment: Occassionally    Drug use: No        Tyler Cheng, DNP  04/15/25 1428

## 2025-09-02 RX ORDER — LOSARTAN POTASSIUM 100 MG/1
100 TABLET ORAL DAILY
Qty: 90 TABLET | Refills: 3 | OUTPATIENT
Start: 2025-09-02

## (undated) DEVICE — PAD DEFIB CADENCE ADULT R2

## (undated) DEVICE — OMNIPAQUE 350MG 150ML VIAL

## (undated) DEVICE — KIT SYR REUSABLE

## (undated) DEVICE — CATH JACKY RADIAL 3.5 110CM

## (undated) DEVICE — PACK CATH LAB

## (undated) DEVICE — PAD RADI FEMORAL

## (undated) DEVICE — CATH 5FR MP 125CM 5/BX

## (undated) DEVICE — HEMOSTAT VASC BAND REG 24CM

## (undated) DEVICE — WIRE GUIDE SAFE-T-J .035 260CM

## (undated) DEVICE — KIT HAND CONTROL HIGH PRESSUR

## (undated) DEVICE — KIT MANIFOLD LOW PRESS TUBING

## (undated) DEVICE — KIT GLIDESHEATH SLEND 6FR 10CM